# Patient Record
Sex: MALE | Race: WHITE | Employment: FULL TIME | URBAN - METROPOLITAN AREA
[De-identification: names, ages, dates, MRNs, and addresses within clinical notes are randomized per-mention and may not be internally consistent; named-entity substitution may affect disease eponyms.]

---

## 2018-03-20 ENCOUNTER — OFFICE VISIT - RIVER FALLS (OUTPATIENT)
Dept: FAMILY MEDICINE | Facility: CLINIC | Age: 53
End: 2018-03-20

## 2018-05-07 ENCOUNTER — AMBULATORY - RIVER FALLS (OUTPATIENT)
Dept: FAMILY MEDICINE | Facility: CLINIC | Age: 53
End: 2018-05-07

## 2018-05-08 LAB — CREAT SERPL-MCNC: 1.11 MG/DL (ref 0.7–1.33)

## 2018-05-09 ENCOUNTER — OFFICE VISIT - RIVER FALLS (OUTPATIENT)
Dept: FAMILY MEDICINE | Facility: CLINIC | Age: 53
End: 2018-05-09

## 2018-05-09 ASSESSMENT — MIFFLIN-ST. JEOR: SCORE: 2013.77

## 2018-12-14 ENCOUNTER — AMBULATORY - RIVER FALLS (OUTPATIENT)
Dept: FAMILY MEDICINE | Facility: CLINIC | Age: 53
End: 2018-12-14

## 2018-12-15 LAB
CREAT SERPL-MCNC: 1.1 MG/DL (ref 0.7–1.33)
GLUCOSE BLD-MCNC: 89 MG/DL (ref 65–99)

## 2018-12-17 ENCOUNTER — OFFICE VISIT - RIVER FALLS (OUTPATIENT)
Dept: FAMILY MEDICINE | Facility: CLINIC | Age: 53
End: 2018-12-17

## 2018-12-17 ASSESSMENT — MIFFLIN-ST. JEOR: SCORE: 1972.03

## 2020-02-25 ENCOUNTER — OFFICE VISIT - RIVER FALLS (OUTPATIENT)
Dept: FAMILY MEDICINE | Facility: CLINIC | Age: 55
End: 2020-02-25

## 2020-02-25 ASSESSMENT — MIFFLIN-ST. JEOR: SCORE: 1982.92

## 2020-03-03 ENCOUNTER — OFFICE VISIT - RIVER FALLS (OUTPATIENT)
Dept: FAMILY MEDICINE | Facility: CLINIC | Age: 55
End: 2020-03-03

## 2020-05-26 ENCOUNTER — OFFICE VISIT - RIVER FALLS (OUTPATIENT)
Dept: FAMILY MEDICINE | Facility: CLINIC | Age: 55
End: 2020-05-26

## 2020-06-09 ENCOUNTER — OFFICE VISIT - RIVER FALLS (OUTPATIENT)
Dept: FAMILY MEDICINE | Facility: CLINIC | Age: 55
End: 2020-06-09

## 2020-06-16 ENCOUNTER — OFFICE VISIT - RIVER FALLS (OUTPATIENT)
Dept: FAMILY MEDICINE | Facility: CLINIC | Age: 55
End: 2020-06-16

## 2020-06-30 ENCOUNTER — OFFICE VISIT - RIVER FALLS (OUTPATIENT)
Dept: FAMILY MEDICINE | Facility: CLINIC | Age: 55
End: 2020-06-30

## 2020-10-20 ENCOUNTER — OFFICE VISIT - RIVER FALLS (OUTPATIENT)
Dept: FAMILY MEDICINE | Facility: CLINIC | Age: 55
End: 2020-10-20

## 2020-12-10 ENCOUNTER — COMMUNICATION - RIVER FALLS (OUTPATIENT)
Dept: FAMILY MEDICINE | Facility: CLINIC | Age: 55
End: 2020-12-10

## 2021-03-22 ENCOUNTER — OFFICE VISIT - RIVER FALLS (OUTPATIENT)
Dept: FAMILY MEDICINE | Facility: CLINIC | Age: 56
End: 2021-03-22

## 2021-03-22 ASSESSMENT — MIFFLIN-ST. JEOR: SCORE: 2020.57

## 2021-04-26 ENCOUNTER — AMBULATORY - RIVER FALLS (OUTPATIENT)
Dept: FAMILY MEDICINE | Facility: CLINIC | Age: 56
End: 2021-04-26

## 2021-04-27 ENCOUNTER — COMMUNICATION - RIVER FALLS (OUTPATIENT)
Dept: FAMILY MEDICINE | Facility: CLINIC | Age: 56
End: 2021-04-27

## 2021-04-27 LAB
BUN SERPL-MCNC: 17 MG/DL (ref 7–25)
BUN/CREAT RATIO - HISTORICAL: ABNORMAL (ref 6–22)
CALCIUM SERPL-MCNC: 9.4 MG/DL (ref 8.6–10.3)
CHLORIDE BLD-SCNC: 107 MMOL/L (ref 98–110)
CHOLEST SERPL-MCNC: 187 MG/DL
CHOLEST/HDLC SERPL: 4.1 {RATIO}
CO2 SERPL-SCNC: 29 MMOL/L (ref 20–32)
CREAT SERPL-MCNC: 1.32 MG/DL (ref 0.7–1.33)
EGFRCR SERPLBLD CKD-EPI 2021: 59 ML/MIN/1.73M2
GLUCOSE BLD-MCNC: 94 MG/DL (ref 65–99)
HDLC SERPL-MCNC: 46 MG/DL
LDLC SERPL CALC-MCNC: 121 MG/DL
NONHDLC SERPL-MCNC: 141 MG/DL
POTASSIUM BLD-SCNC: 4.5 MMOL/L (ref 3.5–5.3)
SODIUM SERPL-SCNC: 140 MMOL/L (ref 135–146)
TRIGL SERPL-MCNC: 95 MG/DL

## 2022-02-12 VITALS
WEIGHT: 254.8 LBS | SYSTOLIC BLOOD PRESSURE: 130 MMHG | SYSTOLIC BLOOD PRESSURE: 142 MMHG | OXYGEN SATURATION: 97 % | TEMPERATURE: 98.7 F | HEART RATE: 68 BPM | HEIGHT: 72 IN | BODY MASS INDEX: 34.51 KG/M2 | HEART RATE: 84 BPM | TEMPERATURE: 97.9 F | DIASTOLIC BLOOD PRESSURE: 64 MMHG | WEIGHT: 249 LBS | DIASTOLIC BLOOD PRESSURE: 84 MMHG

## 2022-02-12 VITALS
HEART RATE: 86 BPM | OXYGEN SATURATION: 84 % | TEMPERATURE: 98.6 F | HEIGHT: 72 IN | DIASTOLIC BLOOD PRESSURE: 72 MMHG | WEIGHT: 256.3 LBS | SYSTOLIC BLOOD PRESSURE: 128 MMHG | BODY MASS INDEX: 34.72 KG/M2

## 2022-02-12 VITALS
HEIGHT: 72 IN | HEART RATE: 64 BPM | BODY MASS INDEX: 33.26 KG/M2 | DIASTOLIC BLOOD PRESSURE: 78 MMHG | TEMPERATURE: 98 F | SYSTOLIC BLOOD PRESSURE: 130 MMHG | WEIGHT: 245.6 LBS

## 2022-02-12 VITALS
HEART RATE: 64 BPM | DIASTOLIC BLOOD PRESSURE: 78 MMHG | BODY MASS INDEX: 33.59 KG/M2 | HEIGHT: 72 IN | SYSTOLIC BLOOD PRESSURE: 126 MMHG | TEMPERATURE: 97.4 F | WEIGHT: 248 LBS

## 2022-02-15 NOTE — TELEPHONE ENCOUNTER
Order, demographics, and note brought to [facility] per request, patient is aware they will contact him to schedule.

## 2022-02-15 NOTE — PROGRESS NOTES
Chief Complaint    rash area on left leg, left hand middle finger painfull, coughing, high blood pressure  History of Present Illness      Patient is here with several concerns.  He has had elevation of his blood pressure over the last year.  He gives blood on a regular basis and his blood pressure checked frequently.  Last spring his numbers were above 140/90.  They have stayed that way that time.  He is also checked at work with similar findings.  There is a family history of retention in his father.  He has a blood panel drawn yearly for his work wellness program.  He reports all of his numbers are normal.       He has a painful nodule on the proximal phalanx of his left middle finger.  He has mild discomfort with flexion.  If he grabs something firm it is painful.  He also has pain in the MCP joint.  Denies trigger finger.       He has a persistent rash on the anterior left leg.  This was previously treated with topical steroids without much success.       He has had a cough for last 2 weeks.  Began with a minor upper respiratory infection.  Last week he had fever, chills, and sweats.  He was playing on the couch all weekend.  His cough has become more productive.  He is to have fatigue.  Review of Systems      See HPI.  All other review of systems negative.  Physical Exam   Vitals & Measurements    T: 98.7(Tympanic)  HR: 84(Peripheral)  BP: 142/84  SpO2: 97%     WT: 249 lb       Alert, oriented, no acute distress      Ear canals patent, TMs clear      Throat is clear      Neck supple without adenopathy or thyromegaly       Heart has a regular rate and rhythm with no murmurs, gallops, or rubs effusion       Lungs are clear with slightly decreased basilar air movement       No lower extremity edema       Psoriatic plaque on the anterior left leg       Chest x-ray there is a questionable retrocardiac infiltrate  Assessment/Plan       1. Psoriasis         Treatment with clobetasol topical cream twice daily and  follow-up if symptoms are improving                2. Atypical pneumonia         Rest, analgesics, antibiotics, azithromycin        Follow-up if not improving                Nodule of flexor tendon sheath         Reassurance, analgesics as needed                Primary hypertension         Lisinopril 10 mg daily recheck in 1 month with creatinine and potassium        He will bring in these wellness labs for review at that time as well.  Side effects of medications discussed         Ordered:          azithromycin, 1 tab, PO, Daily, Instructions: Take 2 today then 1 daily for 4 days, # 6 tab(s), 0 Refill(s), Type: Maintenance, Pharmacy: [facility], 1 tab po daily,x5 day(s),Instr:Take 2 today then 1 daily for 4 days          lisinopril, 1 tab(s) ( 10 mg ), PO, Daily, # 30 tab(s), 1 Refill(s), Type: Maintenance, Pharmacy: [facility], 1 tab(s) po daily                Orders:         clobetasol topical, 1 lane, TOP, BID, # 30 g, 1 Refill(s), Type: Maintenance, Pharmacy: [facility], 1 lane top bid         clobetasol topical, 1 lane, TOP, BID, # 45 g, 0 Refill(s), Type: Maintenance, Pharmacy: [facility], 1 lane top bid         XR Chest PA/Lat (Request), Cough  Patient Information     Name:JAZIEL CAMPBELLJABIER MONTAGUE      Address:      [address] [place] [id number]     Sex:Male     YOB: 1965     [phone]     MRN:[id number]     FIN:[id number]     Location:[facility]     Date of Service:03/20/2018      Primary Care Physician:       Royer Treadwell MD, [phone]  Problem List/Past Medical History    Ongoing     Obesity     Obstructive sleep apnea syndrome     Tobacco abuse     Tubular adenoma      Comments: x 2     Tubulovillous adenoma      Comments: x 1    Historical  Procedure/Surgical History     Colonoscopy (05/01/2014)     Polysomnogram (12/09/2013)     Excision of lesion of skin (04/2006)     Appendectomy (01/1985)  Medications        azithromycin 250 mg oral tablet: 1  tab, PO, Daily, for 5 day(s), Take 2 today then 1 daily for 4 days, 6 tab(s), 0 Refill(s).        clobetasol 0.05% topical cream: 1 lane, TOP, BID, 45 g, 0 Refill(s).        lisinopril 10 mg oral tablet: 10 mg, 1 tab(s), PO, Daily, 30 tab(s), 1 Refill(s).                Allergies    No known allergies  Social History    Smoking Status - 03/20/2018     Former smoker     Alcohol - 11/07/2013      Current, occasional     Tobacco - 11/07/2013      Past  Immunizations      Vaccine Date Status      tetanus/diphth/pertuss (Tdap) adult/adol 11/17/2007 Recorded  Lab Results      Results (Last 90 days)      No results located.

## 2022-02-15 NOTE — NURSING NOTE
Depression Screening Entered On:  3/22/2021 3:31 PM CDT    Performed On:  3/22/2021 3:30 PM CDT by Farzana HOLT, Snow               Depression Screening   Little Interest - Pleasure in Activities :   Not at all   Feeling Down, Depressed, Hopeless :   Not at all   Initial Depression Screen Score :   0 Score   Poor Appetite or Overeating :   Not at all   Trouble Falling or Staying Asleep :   Not at all   Feeling Tired or Little Energy :   Not at all   Feeling Bad About Yourself :   Not at all   Trouble Concentrating :   Several days   Moving or Speaking Slowly :   Not at all   Thoughts Better Off Dead or Hurting Self :   Not at all   Difficulty at Work, Home, Getting Along :   Not difficult at all   Detailed Depression Screen Score :   1    Total Depression Screen Score :   1    Snow Yanes MA - 3/22/2021 3:30 PM CDT

## 2022-02-15 NOTE — NURSING NOTE
Depression Screening Entered On:  2/26/2020 9:08 AM CST    Performed On:  2/25/2020 9:08 AM CST by Naomie Coles CMA               Depression Screening   Little Interest - Pleasure in Activities :   Not at all   Feeling Down, Depressed, Hopeless :   Not at all   Initial Depression Screen Score :   0    Trouble Falling or Staying Asleep :   Not at all   Feeling Tired or Little Energy :   Not at all   Poor Appetite or Overeating :   Not at all   Feeling Bad About Yourself :   Not at all   Trouble Concentrating :   Nearly every day   Moving or Speaking Slowly :   Not at all   Thoughts Better Off Dead or Hurting Self :   Not at all   Detailed Depression Screen Score :   3    Total Depression Screen Score :   3    Naomie Coles CMA - 2/26/2020 9:08 AM CST

## 2022-02-15 NOTE — PROGRESS NOTES
Patient:   FARZANEH CAMPBELL            MRN: [id number]            FIN: [id number]               Age:   54 years     Sex:  Male     :  1965   Associated Diagnoses:   Colon polyp; Hypertension goal BP (blood pressure) < 130/80; Obstructive sleep apnea syndrome; Physical exam   Author:   Greg Plasencia MD      Visit Information      Date of Service: 2020 01:40 pm  Performing Location: [facility]  Encounter#: [id number]      Primary Care Provider (PCP):  Royer Treadwell MD    NPI# [id number]      Referring Provider:  Greg Plasencia MD    NPI# [id number]      Chief Complaint   2020 1:53 PM CST    f/u HTN              Additional Information:No additional information recorded during visit.   Chief complaint and symptoms as noted above and confirmed with patient.  Recent lab and diagnostic studies reviewed with patient      History of Present Illness   2020: Farzaneh presents to clinic for annual physical as well as hypertension refill.  Typically sees GTG.  Tolerating medications without issues.  Typically undergoes an employer based labs screening each year.  Overdue for follow-up colonoscopy.  Originally had colonoscopy in  with 3 adenomatous polyps.  Not aware of any previous PSA screening.  Does have sleep apnea and is compliant with CPAP use.         Review of Systems   Constitutional:  No fever, No chills.    Eye:  Negative except as documented in history of present illness.    Ear/Nose/Mouth/Throat:  Negative except as documented in history of present illness.    Respiratory:  No shortness of breath.    Cardiovascular:  No chest pain, No palpitations, No peripheral edema, No syncope.    Gastrointestinal:  No nausea, No vomiting, No abdominal pain.    Genitourinary:  No dysuria, No hematuria.    Hematology/Lymphatics:  Negative except as documented in history of present illness.    Endocrine:  No excessive thirst, No polyuria.    Immunologic:  No recurrent fevers.    Musculoskeletal:   No joint pain, No muscle pain.    Neurologic:  Alert and oriented X4, No numbness, No tingling, No headache.       Health Status   Allergies:    Allergic Reactions (Selected)  No Known Medication Allergies   Medications:  (Selected)   Prescriptions  Prescribed  lisinopril 10 mg oral tablet: = 1 tab(s) ( 10 mg ), PO, Daily, # 90 tab(s), 3 Refill(s), Type: Maintenance, Pharmacy: [facility], 1 tab(s) Oral daily,    Medications          *denotes recorded medication          lisinopril 10 mg oral tablet: 10 mg, 1 tab(s), PO, Daily, 90 tab(s), 3 Refill(s).       Problem list:    All Problems  Tubulovillous adenoma / SNOMED CT 93821890 / Confirmed  Hypertension goal BP (blood pressure) < 130/80 / SNOMED CT 0706449884 / Confirmed  Obesity / ICD-9-.00 / Probable  Obstructive sleep apnea syndrome / SNOMED CT 132675132 / Confirmed  Tobacco abuse / ICD-9-.1 / Confirmed  Tubular adenoma / SNOMED CT 3314879279 / Confirmed      Histories   Past Medical History:    Active  Obstructive sleep apnea syndrome (285926452): Onset on 12/9/2013 at 48 years.  Tobacco abuse (305.1)  Hypertension goal BP (blood pressure) < 130/80 (5439572523)   Family History:    No family history items have been selected or recorded.   Procedure history:    Colonoscopy (836552915) on 5/1/2014 at 48 Years.  Comments:  5/13/2014 3:43 PM CDT - Clarissa Morales CMA  Indication: screening  Sedation: MAC (sleep apnea)  Tubular adenoma x 2, tubuvillious adenoma x 1  Repeat in 3 years  Polysomnogram (984709738) on 12/9/2013 at 48 Years.  Comments:  12/18/2013 7:38 PM CST - Treasure Noel CMA  AHI of 18.7/hr with REM AHI of 54.5/hr.  CPAP titrated to +8cm H2o which reduced AHI to 1.4/hr and eliminated snoring.  Excision of lesion of skin (82287622) in the month of 4/2006 at 40 Years.  Appendectomy (040862103) in the month of 1/1985 at 19 Years.   Social History:        Alcohol Assessment            Current, occasional      Tobacco Assessment             Past        Physical Examination   vital signs stable, as noted above   Vital Signs   2/25/2020 1:53 PM CST Temperature Tympanic 97.4 DegF  LOW    Peripheral Pulse Rate 64 bpm    Systolic Blood Pressure 126 mmHg    Diastolic Blood Pressure 78 mmHg    Mean Arterial Pressure 94 mmHg    BP Site Right arm      Measurements from flowsheet : Measurements   2/25/2020 1:53 PM CST Height Measured - Standard 72 in    Weight Measured - Standard 248 lb    BSA 2.39 m2    Body Mass Index 33.63 kg/m2  HI      General:  Alert and oriented, No acute distress.    Eye:  Extraocular movements are intact.    HENT:  Normocephalic, Oral mucosa is moist.    Neck:  Supple, No carotid bruit, No jugular venous distention, No lymphadenopathy.    Respiratory:  Lungs are clear to auscultation, Respirations are non-labored.    Cardiovascular:  Normal rate, No murmur, No edema.    Gastrointestinal:  Soft, Non-distended, Normal bowel sounds, No organomegaly.    Musculoskeletal:  Normal range of motion, Normal strength, No tenderness.    Integumentary:  keloid appearing scar/lesion on left anterior tibial surface.    Neurologic:  Alert, Oriented, Normal motor function, No focal deficits.    Cognition and Speech:  Oriented, Speech clear and coherent.    Psychiatric:  Appropriate mood & affect.       Impression and Plan   Diagnosis     Colon polyp (LXS15-SV K63.5).     Hypertension goal BP (blood pressure) < 130/80 (OXD42-HY I10).     Obstructive sleep apnea syndrome (FRH75-LX G47.33).     Physical exam (BXL78-MQ Z00.00).       .) hypertension, controlled  current antihypertensive regimen: lisinopril 10mg daily  regimen changes:  intolerance:  future titration/work-up plan:     .) health maintenance  - YAIMA, adherent with CPAP use  - h/o multiple adenomatous colonic polyps, last colonoscopy in '14   - overdue for f/u; arrange f/u colonoscopy, ASA3   - ECG completed  - declines PSA screening  - comprehensive employer lab screening - advised to share  results when completed  - skin lesion on left shin - advised to d/w GTG in the future to consider biopsy    RTC annually

## 2022-02-15 NOTE — PROGRESS NOTES
Patient:   FARZANEH CAMPBELL            MRN: [id number]            FIN: [id number]               Age:   54 years     Sex:  Male     :  1965   Associated Diagnoses:   Left anterior fascicular block   Author:   Greg Plasencia MD      Procedure   EKG procedure   Indication: pre-procedure.     Position: supine.     EKG findings   Interpretation: by primary care provider.     Rhythm: heart rate  59  beats/min, sinus normal.     Axis: left axis deviation.     Within normal limits.     Intervals: MN normal, QRS normal, QT normal.     Normal EKG.     P waves: normal.     QRS complex: normal, no Q waves present.     ST-T-U complex: normal.     Interpretation: no ST-T wave abnormalities.     Discussed: with patient.        Impression and Plan   Diagnosis     Left anterior fascicular block (CYV11-ZU I44.4).     Orders

## 2022-02-15 NOTE — TELEPHONE ENCOUNTER
---------------------  From: Amira Gonzalez RN   To: Rani Cortés;     Sent: 4/6/2021 8:55:17 AM CDT  Subject: HA issue     1 hearing aid he cannot get to turn on (left ear)    Appears to be charging. May be plugged    Phone: [phone]    Says he can stop in quickly this morning for you to look at it if that works for you.---------------------  From: Rani Cortés   To: Amira Gonzalez RN;     Sent: 4/6/2021 10:03:41 AM CDT  Subject: RE: HA issue     Could someone call him back and let him know that I had a 1:30 open up today if that works for him?  If not he can drop it off and I can take a look at it when I have a break.  Thanks!  Rani---------------------  From: Amira Gonzalez RN   To: Appointment Pool ([id number]);     Sent: 4/6/2021 10:12:48 AM CDT !  Subject: FW: HA issue---------------------  From: Magaly Cody (Appointment Pool ([id number]))   To: Amira Gonzalez RN;     Sent: 4/6/2021 10:25:03 AM CDT  Subject: RE: HA issue     pt already dropped off hearing aide for her to just take a look when she has time

## 2022-02-15 NOTE — LETTER
(Inserted Image. Unable to display)     February 26, 2021      FARZANEH MARIA SHER  [place], [place] [id number]          Dear FARZANEH,      Thank you for selecting [facility] (previously [facility]) for your healthcare needs.    Our records indicate you are due for the following services:    Annual Physical.  Hypertension check ~ please remember to bring your at-home blood pressure readings with you to your appointment.   Non-Fasting Labs.    If you had your labs done at another facility or with [facility] at [facility], please bring in a copy of the results to your next visit, mail a copy, or drop off a copy of your results to your Healthcare Provider.    (FYI   Regarding office visits: In some instances, a video visit or telephone visit may be offered as an option.)    You are due for lab work and an office visit; please schedule the lab appointment 1 week before the office visit.  This will assure all results are available to discuss with your Healthcare Provider during your visit.    **It is very helpful if you bring your medication bottles to your appointment.  This assures we have all of your current medications, including strength and dosing information, documented accurately in your medical record.    To schedule an appointment or if you have further questions, please contact your clinic at [phone].      Powered by [email or web address]    Sincerely,    Greg Plasencia MD

## 2022-02-15 NOTE — PROGRESS NOTES
Chief Complaint    med check--refill lisinopril  History of Present Illness       Patient is here for follow-up on his hypertension.  He has been on lisinopril with no side effects.  He reports his blood pressure is usually in the 120/60 range.  He is due for laboratory evaluation today.  He is also due for another lipid panel has been over 5 years since his last check.  Review of Systems       See HPI.  All other review of systems negative.  Physical Exam   Vitals & Measurements    T: 98.6  F (Tympanic)  HR: 86 (Peripheral)  BP: 128/72  SpO2: 84%     HT: 72 in  WT: 256.3 lb  BMI: 34.76        Alert, oriented, no acute distress       Neck supple without adenopathy, thyromegaly, carotid bruit       Heart has a regular rate and rhythm       Lungs are clear       Cooperative, appropriate affect  Assessment/Plan       1. Primary hypertension (I10)          Primary hypertension well-controlled         Continue with lisinopril         Return laboratory visit for lipid panel and BMP.         Ordered:          lisinopril, = 1 tab(s) ( 10 mg ), PO, Daily, # 90 tab(s), 3 Refill(s), Type: Maintenance, Pharmacy: [facility], 1 tab(s) Oral daily, 72, in, 03/22/21 14:27:00 CDT, Height Measured, 256.3, lb, 03/22/21 14:27:00 CDT, Weight Measured, (Ordered)                Orders:         lisinopril, = 1 tab(s) ( 10 mg ), PO, Daily, # 90 tab(s), 3 Refill(s), Type: Hard Stop, Pharmacy: [facility], (Completed)         Return to Clinic (Request), RFV: fasting labs per GTG: lipid panel, BMP, Return in 1wk  Patient Information     Name:JAZIEL CAMPBELLJABIER MONTAGUE      Address:      [address] [place] [id number]     Sex:Male     YOB: 1965     [phone]     Emergency Contact:ANGELA CAMPBELL     MRN:[id number]     FIN:[id number]     Location:[facility]     Date of Service:03/22/2021      Primary Care Physician:       Royer Treadwell MD, [phone]       Royer Treadwell MD, [phone]       Attending Physician:       Royer Treadwell MD, [phone]  Problem List/Past Medical History    Ongoing     Hypertension goal BP (blood pressure) < 130/80     Obesity     Obstructive sleep apnea syndrome     Tobacco abuse     Tubular adenoma       Comments: x 2     Tubulovillous adenoma       Comments: x 1    Historical     No qualifying data  Procedure/Surgical History     Colonoscopy (06/12/2020)      Comments: Indication: History of adenomatous polyps.      Sedation: MAC.      Impression: Two 4 - 6 mm polyps in proximal ascending colon.        Recommendation: Repeat in 5 years..     Colonoscopy (05/01/2014)      Comments: Indication: screening      Sedation: MAC (sleep apnea)      Tubular adenoma x 2, tubuvillious adenoma x 1      Repeat in 3 years.     Polysomnogram (12/09/2013)      Comments: AHI of 18.7/hr with REM AHI of 54.5/hr.  CPAP titrated to +8cm H2o which reduced AHI to 1.4/hr and eliminated snoring..     Excision of lesion of skin (04/2006)     Appendectomy (01/1985)  Medications    lisinopril 10 mg oral tablet, 10 mg= 1 tab(s), Oral, daily, 3 refills  Allergies    No Known Medication Allergies  Social History    Smoking Status     Former smoker     Alcohol      Current, occasional     Electronic Cigarette/Vaping      Electronic Cigarette Use: Never.     Tobacco      Former smoker, quit more than 30 days ago  Immunizations      Vaccine Date Status          tetanus/diphth/pertuss (Tdap) adult/adol 11/17/2007 Recorded

## 2022-02-15 NOTE — TELEPHONE ENCOUNTER
Entered by Naomie Coles CMA on December 10, 2020 1:06:55 PM CST  ---------------------  From: Naomie Coles CMA   To: [facility]    Sent: 12/10/2020 1:06:54 PM CST  Subject: Medication Management     ** Not Approved: Refill not appropriate, filled 2/25/20 x 1year - pt should still have refill remaining **  lisinopril (LISINOPRIL 10MG TABLET)  TAKE ONE TABLET BY MOUTH ONCE DAILY  Qty:  90 EA        Days Supply:  90        Refills:  3          Substitutions Allowed     Route To Pharmacy - [facility]   Signed by Naomie Coles CMA            ------------------------------------------  From: [facility]  To: Greg Plasencia MD  Sent: December 9, 2020 4:21:27 PM CST  Subject: Medication Management  Due: December 8, 2020 10:07:04 AM CST     ** On Hold Pending Signature **     Drug: lisinopril (lisinopril 10 mg oral tablet), TAKE ONE TABLET BY MOUTH ONCE DAILY  Quantity: 90 EA  Days Supply: 90  Refills: 3  Substitutions Allowed  Notes from Pharmacy:     Dispensed Drug: lisinopril (lisinopril 10 mg oral tablet), TAKE ONE TABLET BY MOUTH ONCE DAILY  Quantity: 90 EA  Days Supply: 90  Refills: 3  Substitutions Allowed  Notes from Pharmacy:  ------------------------------------------

## 2022-02-15 NOTE — PROGRESS NOTES
Chief Complaint    HTN f/u, review labs from 5/7/18.  History of Present Illness      Patient is here for follow-up on his primary hypertension.  He was started on lisinopril 10 mg daily about 6 weeks ago.  Blood pressure has improved.  Systolic numbers are down at least 10 points.  He had a recent creatinine and potassium that were normal.       He requests refill of clobetasol for psoriasis on the left leg.       He inquires about sildenafil for erectile dysfunction.  This is been a problem for the last few months.  He is able to obtain an erection but it is not firm enough for intercourse.  Erectile dysfunction was present prior to beginning lisinopril.  Review of Systems      See HPI.  All other review of systems negative.  Physical Exam   Vitals & Measurements    T: 97.9   F (Tympanic)  HR: 68(Peripheral)  BP: 130/64     HT: 72 in  WT: 254.8 lb  BMI: 34.55       Alert, oriented, no acute distress      Normal heart rate      Nonlabored breathing      Cooperative, appropriate affect  Assessment/Plan       1. Primary hypertension (I10)         Improved with current dose of lisinopril, continue 10 mg daily, prescription sent to pharmacy         Orders:          lisinopril, 1 tab(s) ( 10 mg ), PO, Daily, # 30 tab(s), 1 Refill(s), Type: Hard Stop, Pharmacy: [facility], (Completed)          lisinopril, 1 tab(s) ( 10 mg ), PO, Daily, # 90 tab(s), 3 Refill(s), Type: Maintenance, Pharmacy: [facility], 1 tab(s) po daily, (Ordered)                Erectile dysfunction (N52.9)         Suspect is multifactorial        Trial of sildenafil 50 mg as needed        Discussed side effects medication                Psoriasis (L40.9)         Improving with clobetasol        We will continue with the cream as needed                Orders:         clobetasol topical, 1 lane, TOP, BID, # 45 g, 1 Refill(s), Type: Maintenance, Pharmacy: [facility], 1 lane top bid, (Ordered)         clobetasol topical, 1 lane, TOP, BID, # 45 g, 0  Refill(s), Type: Hard Stop, Pharmacy: [facility], (Completed)         sildenafil, 1 tab(s) ( 50 mg ), po, daily, # 10 tab(s), 1 Refill(s), Type: Maintenance, Pharmacy: [facility], 1 tab(s) po daily, (Ordered)  Patient Information     Name:FARZANEH CAMPBELL CLARI      Address:      [address] [place] [address]     Sex:Male     YOB: 1965     [phone]     Emergency Contact:FLACA CAMPBELL     MRN:[id number]     FIN:[id number]     Location[facility]     Date of Service:05/09/2018      Primary Care Physician:       Royer Treadwell MD, [phone]      Attending Physician:       Royer Treadwell MD, [phone]  Problem List/Past Medical History    Ongoing     Obesity     Obstructive sleep apnea syndrome     Tobacco abuse     Tubular adenoma       Comments: x 2     Tubulovillous adenoma       Comments: x 1    Historical     No qualifying data  Procedure/Surgical History     Colonoscopy (05/01/2014)       Comments: Indication: screening<br/>Sedation: MAC (sleep apnea)<br/>Tubular adenoma x 2, tubuvillious adenoma x 1<br/>Repeat in 3 years     Polysomnogram (12/09/2013)       Comments: AHI of 18.7/hr with REM AHI of 54.5/hr. &nbsp;CPAP titrated to +8cm H2o which reduced AHI to 1.4/hr and eliminated snoring.     Excision of lesion of skin (04/2006)     Appendectomy (01/1985)  Medications        lisinopril 10 mg oral tablet: 10 mg, 1 tab(s), PO, Daily, 90 tab(s), 3 Refill(s).        sildenafil 50 mg oral tablet: 50 mg, 1 tab(s), po, daily, 10 tab(s), 1 Refill(s).        clobetasol 0.05% topical cream: 1 lane, TOP, BID, 45 g, 1 Refill(s).                Allergies    No known allergies  Social History    Smoking Status - 05/09/2018     Former smoker     Alcohol      Current, occasional, 02/07/2012     Tobacco      Past, 11/07/2013  Immunizations      Vaccine Date Status      tetanus/diphth/pertuss (Tdap) adult/adol 11/17/2007 Recorded  Lab Results          Lab  Results (Last 4 results within 90 days)           Potassium Level: 4.3 mmol/L [3.5 mmol/L - 5.3 mmol/L] (05/07/18 09:50:00)          Creatinine Level: 1.11 mg/dL [0.7 mg/dL - 1.33 mg/dL] (05/07/18 09:50:00)          eGFR: 75 mL/min/1.73m2 [3.5 mmol/L - 5.3 mmol/L] (05/07/18 09:50:00)          eGFR African American: 87 mL/min/1.73m2 [3.5 mmol/L - 5.3 mmol/L] (05/07/18 09:50:00)

## 2022-02-15 NOTE — NURSING NOTE
CAGE Assessment Entered On:  3/22/2021 3:30 PM CDT    Performed On:  3/22/2021 3:30 PM CDT by Snow Yanes MA               Assessment   Have you ever felt you should cut down on your drinking :   No   Have people annoyed you by criticizing your drinking :   No   Have you ever felt bad or guilty about your drinking :   No   Have you ever taken a drink first thing in the morning to steady your nerves or get rid of a hangover (Eye-opener) :   No   CAGE Score :   0    Snow Yanes MA - 3/22/2021 3:30 PM CDT

## 2022-02-15 NOTE — NURSING NOTE
Comprehensive Intake Entered On:  2/25/2020 1:56 PM CST    Performed On:  2/25/2020 1:53 PM CST by Naomie Coles CMA               Summary   Chief Complaint :   f/u HTN   Weight Measured :   248 lb(Converted to: 248 lb 0 oz, 112.49 kg)    Height Measured :   72 in(Converted to: 6 ft 0 in, 182.88 cm)    Body Mass Index :   33.63 kg/m2 (HI)    Body Surface Area :   2.39 m2   Systolic Blood Pressure :   126 mmHg   Diastolic Blood Pressure :   78 mmHg   Mean Arterial Pressure :   94 mmHg   Peripheral Pulse Rate :   64 bpm   BP Site :   Right arm   Temperature Tympanic :   97.4 DegF(Converted to: 36.3 DegC)  (LOW)    Naomie Cloes CMA - 2/25/2020 1:53 PM CST   Health Status   Allergies Verified? :   Yes   Medication History Verified? :   Yes   Immunizations Current :   Yes   Medical History Verified? :   Yes   Pre-Visit Planning Status :   Completed   Tobacco Use? :   Former smoker   Naomie Coles CMA - 2/25/2020 1:53 PM CST   Social History   Social History   (As Of: 2/25/2020 1:56:16 PM CST)   Alcohol:        Current, occasional   (Last Updated: 2/7/2012 3:13:29 PM CST by Basilia Licona)          Tobacco:        Past   (Last Updated: 11/7/2013 8:53:00 AM CST by Treasure Noel CMA)            OB/GYN History   Pregnancy History   (As Of: 2/25/2020 1:56:16 PM CST)   No pregnancy history documented

## 2022-02-15 NOTE — NURSING NOTE
Comprehensive Intake Entered On:  3/22/2021 2:32 PM CDT    Performed On:  3/22/2021 2:27 PM CDT by Farzana HOLT, Snow               Summary   Chief Complaint :   med check--refill lisinopril   Weight Measured :   256.3 lb(Converted to: 256 lb 5 oz, 116.256 kg)    Height Measured :   72 in(Converted to: 6 ft 0 in, 182.88 cm)    Body Mass Index :   34.76 kg/m2 (HI)    Body Surface Area :   2.43 m2   Systolic Blood Pressure :   166 mmHg (HI)    Diastolic Blood Pressure :   80 mmHg   Mean Arterial Pressure :   109 mmHg   Peripheral Pulse Rate :   86 bpm   BP Site :   Right arm   Pulse Site :   Radial artery   BP Method :   Manual   HR Method :   Manual   Temperature Tympanic :   98.6 DegF(Converted to: 37.0 DegC)    Oxygen Saturation :   84 % (LOW)    Snow Yanes MA - 3/22/2021 2:27 PM CDT   Health Status   Allergies Verified? :   Yes   Medication History Verified? :   Yes   Immunizations Current :   Yes   Medical History Verified? :   Yes   Pre-Visit Planning Status :   Completed   Tobacco Use? :   Former smoker   Snow Yanes MA - 3/22/2021 2:27 PM CDT   Consents   Consent for Immunization Exchange :   Consent Granted   Consent for Immunizations to Providers :   Consent Granted   Snow Yanes MA - 3/22/2021 2:27 PM CDT   Meds / Allergies   (As Of: 3/22/2021 2:32:18 PM CDT)   Allergies (Active)   No Known Medication Allergies  Estimated Onset Date:   Unspecified ; Created By:   Naomie Coles CMA; Reaction Status:   Active ; Category:   Drug ; Substance:   No Known Medication Allergies ; Type:   Allergy ; Updated By:   Naomie Coles CMA; Reviewed Date:   2/25/2020 1:52 PM CST        Medication List   (As Of: 3/22/2021 2:32:18 PM CDT)   Prescription/Discharge Order    lisinopril  :   lisinopril ; Status:   Prescribed ; Ordered As Mnemonic:   lisinopril 10 mg oral tablet ; Simple Display Line:   10 mg, 1 tab(s), PO, Daily, 90 tab(s), 3 Refill(s) ; Ordering Provider:   Greg Plasencia MD; Catalog Code:   lisinopril ;  Order Dt/Tm:   2/25/2020 2:11:59 PM CST            ID Risk Screen   Recent Travel History :   No recent travel   Family Member Travel History :   No recent travel   Other Exposure to Infectious Disease :   Unknown   COVID-19 Testing Status :   Positive COVID-19 test greater than 30 days ago   Snow Yanes MA - 3/22/2021 2:27 PM CDT   Social History   Social History   (As Of: 3/22/2021 2:32:18 PM CDT)   Alcohol:        Current, occasional   (Last Updated: 2/7/2012 3:13:29 PM CST by Basilia Licona)          Tobacco:        Former smoker, quit more than 30 days ago   (Last Updated: 3/22/2021 2:27:35 PM CDT by Snow Yanes MA)          Electronic Cigarette/Vaping:        Electronic Cigarette Use: Never.   (Last Updated: 3/22/2021 2:27:40 PM CDT by Snow Yanes MA)

## 2022-02-15 NOTE — LETTER
(Inserted Image. Unable to display)   March 31, 2021  [place] [address]          Dear FARZANEH,      Thank you for selecting [facility] for your healthcare needs.    Our records indicate you are due for the following services:     Fasting Lab Tests ~ Please do not eat or drink anything 10 hours prior to your scheduled appointment time.  (Water and any medications that you may need are allowed unless directed otherwise.)    If you had your labs done at another facility or with Direct Access Lab Testing at [facility], please bring in a copy of the results to your next visit, mail a copy, or drop off a copy of your results to your Healthcare Provider.    (FYI   Regarding office visits: In some instances, a video visit or telephone visit may be offered as an option.)        To schedule an appointment or if you have further questions, please contact your clinic at [phone].      Powered by [employer]    Sincerely,    Royer Treadwell M.D.

## 2022-02-15 NOTE — TELEPHONE ENCOUNTER
---------------------  From: Chantel Stern LPN (Phone Messages Pool ([id number]_[facility]))   To: Appointment Pool ([id number]_[facility]);     Cc: Rani Cortés;      Sent: 10/20/2020 9:28:01 AM CDT  Subject: General Message     Pt LM at 8:58am stating he is having problems with is hearing aids. Pt says they will not turn on.     Pt asking to get in today to get a new set of sensors since Rani Cortés is only in clinic 1 day a week.    [phone] Rani to see if ok to work in

## 2022-02-15 NOTE — LETTER
(Inserted Image. Unable to display)   [address] [place]  [address]  April 27, 2021                      [place] [address]        Dear FARZANEH,    Thank you for selecting [facility] for your health care needs.  Below you will find the results of your recent test(s) done at our clinic.     Your recent lab tests look good.      Result Name Current Result Reference Range   Sodium Level (mmol/L)  140 4/26/2021 135 - 146   Potassium Level (mmol/L)  4.5 4/26/2021 3.5 - 5.3   Chloride Level (mmol/L)  107 4/26/2021 98 - 110   CO2 Level (mmol/L)  29 4/26/2021 20 - 32   Glucose Level (mg/dL)  94 4/26/2021 65 - 99   BUN (mg/dL)  17 4/26/2021 7 - 25   Creatinine Level (mg/dL)  1.32 4/26/2021 0.70 - 1.33   eGFR (mL/min/1.73m2) ((L)) 59 4/26/2021 > OR = 60 -    Calcium Level (mg/dL)  9.4 4/26/2021 8.6 - 10.3   Cholesterol (mg/dL)  187 4/26/2021  - <200   Non-HDL Cholesterol ((H)) 141 4/26/2021  - <130   HDL (mg/dL)  46 4/26/2021 > OR = 40 -    Cholesterol/HDL Ratio  4.1 4/26/2021  - <5.0   LDL ((H)) 121 4/26/2021    Triglyceride (mg/dL)  95 4/26/2021  - <150       Please contact me or my assistant at [phone] if you have any questions about your results.    Sincerely,        Anthony Treadwell MD        What do your labs mean?  Below is a glossary of commonly ordered labs:  LDL   Bad Cholesterol   HDL   Good Cholesterol  AST/ALT   Liver Function   Cr/Creatinine   Kidney Function  Microalbumin   Kidney Function  BUN   Kidney Function  PSA   Prostate    TSH   Thyroid Hormone  HgbA1c   Diabetes Test   Hgb (Hemoglobin)   Red Blood Cells

## 2022-02-15 NOTE — NURSING NOTE
Vital Signs Entered On:  3/22/2021 3:29 PM CDT    Performed On:  3/22/2021 3:29 PM CDT by Snow Yanes MA               Vital Signs   Systolic Blood Pressure :   128 mmHg   Diastolic Blood Pressure :   72 mmHg   Mean Arterial Pressure :   91 mmHg   BP Site :   Right arm   Snow Yanes MA - 3/22/2021 3:29 PM CDT

## 2022-02-15 NOTE — PROGRESS NOTES
Patient:   FARZANEH CAMPBELL            MRN: [id number]            FIN: [id number]               Age:   53 years     Sex:  Male     :  1965   Associated Diagnoses:   Hypertension   Author:   Royer Treadwell MD      Visit Information      Date of Service: 2018 03:30 pm  Performing Location: [facility]  Encounter#: [id number]      Primary Care Provider (PCP):  Royer Treadwell MD    NPI# [id number]   Visit type:  On-going care, Scheduled follow-up.         Medication: Follow-up, Refill.    Source of history:  Self, Medical record.    History limitation:  None.       Chief Complaint   2018 3:41 PM CST   here for HTN f/u, had labs done 18.      History of Present Illness             The patient presents for follow-up evaluation of hypertension.  The quality of hypertension symptom(s) since the patient's last visit is described as being unchanged.  The severity of the hypertension symptom(s) since the last visit is mild.  Since the patient's last visit, the timing/course of hypertension symptom(s) remains unchanged.  The context of the hypertension: blood pressure was maintained within the target range.  Exacerbating factors consist of.  Relieving factors consist of medication.  Associated symptoms consist of none.  Prior treatment consists of medication(s): lisinopril.  Additional pertinent history: previous labs reviewed.        Review of Systems   Constitutional:  No fever, No chills, No sweats, No weakness, No fatigue.    Eye:  No recent visual problem.    Ear/Nose/Mouth/Throat:  No decreased hearing, No nasal congestion, No sore throat.    Respiratory:  No shortness of breath, No cough.    Cardiovascular:  Negative, No chest pain, No palpitations, No peripheral edema.    Gastrointestinal:  No nausea, No vomiting, No diarrhea, No constipation, No heartburn.    Genitourinary:  No dysuria, No change in urine stream.    Hematology/Lymphatics:  No bruising tendency, No bleeding  tendency.    Endocrine:  No cold intolerance, No heat intolerance.    Immunologic:  Negative.    Musculoskeletal:  No back pain, No neck pain, No joint pain, No muscle pain.    Integumentary:  No rash, No dryness, No skin lesion.    Neurologic:  Alert and oriented X4, No headache.    Psychiatric:  No anxiety, No depression.       Health Status   Allergies:    Allergic Reactions (Selected)  No known allergies   Medications:  (Selected)   Prescriptions  Prescribed  clobetasol 0.05% topical cream: 1 lane, TOP, BID, # 45 g, 1 Refill(s), Type: Maintenance, Pharmacy: [facility], 1 lane top bid  lisinopril 10 mg oral tablet: 1 tab(s) ( 10 mg ), PO, Daily, # 90 tab(s), 3 Refill(s), Type: Maintenance, Pharmacy: [facility], 1 tab(s) po daily  sildenafil 50 mg oral tablet: 1 tab(s) ( 50 mg ), po, daily, # 10 tab(s), 1 Refill(s), Type: Maintenance, Pharmacy: [facility], 1 tab(s) po daily   Problem list:    All Problems  Hypertension goal BP (blood pressure) < 130/80 / SNOMED CT 3519542764 / Confirmed  Obstructive sleep apnea syndrome / SNOMED CT 132926433 / Confirmed  Obesity / ICD-9-.00 / Probable  Tubular adenoma / SNOMED CT 4449515614 / Confirmed  Tobacco abuse / ICD-9-.1 / Confirmed  Tubulovillous adenoma / SNOMED CT 35558857 / Confirmed      Histories   Past Medical History:    Active  Obstructive sleep apnea syndrome (815645588): Onset on 12/9/2013 at 48 years.  Tobacco abuse (305.1)  Hypertension goal BP (blood pressure) < 130/80 (5452916422)   Family History:    No family history items have been selected or recorded.   Procedure history:    Colonoscopy (SNOMED CT 811870087) on 5/1/2014 at 48 Years.  Comments:  5/13/2014 3:43 PM CDT - Clarissa Morales CMA  Indication: screening  Sedation: MAC (sleep apnea)  Tubular adenoma x 2, tubuvillious adenoma x 1  Repeat in 3 years  Polysomnogram (SNOMED CT 541282559) on 12/9/2013 at 48 Years.  Comments:  12/18/2013 7:38 PM [facility] - Treasure Noel CMA  AHI of 18.7/hr  with REM AHI of 54.5/hr.  CPAP titrated to +8cm H2o which reduced AHI to 1.4/hr and eliminated snoring.  Excision of lesion of skin (SNOMED CT 95700956) in the month of 4/2006 at 40 Years.  Appendectomy (SNOMED CT 696955933) in the month of 1/1985 at 19 Years.   Social History:        Alcohol Assessment            Current, occasional      Tobacco Assessment            Past      Physical Examination   Vital Signs   12/17/2018 3:41 PM CST Temperature Tympanic 98 DegF    Peripheral Pulse Rate 64 bpm    Pulse Site Radial artery    HR Method Manual    Systolic Blood Pressure 130 mmHg    Diastolic Blood Pressure 78 mmHg    Mean Arterial Pressure 95 mmHg    BP Site Left arm    BP Method Manual      Measurements from flowsheet : Measurements   12/17/2018 3:41 PM CST Height Measured - Standard 72 in    Weight Measured - Standard 245.6 lb    BSA 2.38 m2    Body Mass Index 33.31 kg/m2  HI      Eye:  Normal conjunctiva.    HENT:  Normocephalic, Oral mucosa is moist, No pharyngeal erythema.    Neck:  Supple, Non-tender, No carotid bruit, No lymphadenopathy, No thyromegaly.    Respiratory:  Lungs are clear to auscultation, Respirations are non-labored.    Cardiovascular:  Normal rate, Regular rhythm, Normal peripheral perfusion, No edema.    Gastrointestinal:  Soft, Non-tender.    Musculoskeletal:  Normal range of motion.    Integumentary:  Warm, Dry.    Neurologic:  Alert, Oriented.    Psychiatric:  Cooperative, Appropriate mood & affect.       Review / Management   Results review:  Lab results   12/14/2018 2:52 PM CST Sodium Level 139 mmol/L    Potassium Level 4.3 mmol/L    Chloride Level 104 mmol/L    CO2 Level 30 mmol/L    Glucose Level 89 mg/dL    BUN 16 mg/dL    Creatinine 1.10 mg/dL    BUN/Creat Ratio NOT APPLICABLE    eGFR 75 mL/min/1.73m2    eGFR African American 87 mL/min/1.73m2    Calcium Level 9.7 mg/dL   .       Impression and Plan   Diagnosis     Hypertension (ABD62-WN I10).     Course:  Progressing as expected,  Well controlled.    Plan:  Enroll in exercise program, Monitor blood pressure, Weight monitoring, continue current medication.         Diet: Low cholesterol, Sodium restricted.    Patient Instructions:       Counseled: Regarding treatment ( Hypertension ( Diet management, Medication management, Physical activity, Weight management ) ).

## 2022-04-11 ENCOUNTER — OFFICE VISIT (OUTPATIENT)
Dept: FAMILY MEDICINE | Facility: CLINIC | Age: 57
End: 2022-04-11
Payer: COMMERCIAL

## 2022-04-11 VITALS
BODY MASS INDEX: 34.28 KG/M2 | TEMPERATURE: 97.5 F | HEART RATE: 68 BPM | WEIGHT: 253.1 LBS | SYSTOLIC BLOOD PRESSURE: 124 MMHG | OXYGEN SATURATION: 98 % | DIASTOLIC BLOOD PRESSURE: 76 MMHG | HEIGHT: 72 IN

## 2022-04-11 DIAGNOSIS — I10 PRIMARY HYPERTENSION: Primary | ICD-10-CM

## 2022-04-11 DIAGNOSIS — R06.09 DYSPNEA ON EXERTION: ICD-10-CM

## 2022-04-11 DIAGNOSIS — G57.01 SCIATIC MONONEUROPATHY, RIGHT: ICD-10-CM

## 2022-04-11 PROCEDURE — 99214 OFFICE O/P EST MOD 30 MIN: CPT | Performed by: FAMILY MEDICINE

## 2022-04-11 PROCEDURE — 93000 ELECTROCARDIOGRAM COMPLETE: CPT | Performed by: FAMILY MEDICINE

## 2022-04-11 RX ORDER — LISINOPRIL 10 MG/1
10 TABLET ORAL DAILY
Qty: 90 TABLET | Refills: 3 | Status: SHIPPED | OUTPATIENT
Start: 2022-04-11 | End: 2023-02-16

## 2022-04-11 RX ORDER — LISINOPRIL 10 MG/1
10 TABLET ORAL DAILY
COMMUNITY
Start: 2021-03-22 | End: 2022-04-11

## 2022-04-11 NOTE — PROGRESS NOTES
Assessment & Plan     Primary hypertension  Hypertension is under control.  He has had recent laboratory work done to work with normal BMP.  Continue with current dose of lisinopril  - lisinopril (ZESTRIL) 10 MG tablet; Take 1 tablet (10 mg) by mouth in the morning.    Dyspnea on exertion  Symptoms have been worsening in the last 6 months.  Evidence today of significant rhythm abnormality.  Obtain stress echocardiogram for further evaluation we will follow up when results are available  - EKG 12-lead complete w/read - Clinics  - Echocardiogram Exercise Stress; Future    Sciatic mononeuropathy, right  Observe for the next week to 10 days and if no improvement consider prednisone versus formal physical therapy           BMI:   Estimated body mass index is 34.33 kg/m  as calculated from the following:    Height as of this encounter: 1.829 m (6').    Weight as of this encounter: 114.8 kg (253 lb 1.6 oz).   Weight management plan: Discussed healthy diet and exercise guidelines        No follow-ups on file.    Royer Treadwell MD  [facility]    Subjective   Yunior is a 56 year old who presents for the following health issues     History of Present Illness       Hypertension: He presents for follow up of hypertension.  He does not check blood pressure  regularly outside of the clinic. Outpatient blood pressures have not been over 140/90. He does not follow a low salt diet.     He eats 2-3 servings of fruits and vegetables daily.He consumes 1 sweetened beverage(s) daily.He exercises with enough effort to increase his heart rate 10 to 19 minutes per day.  He exercises with enough effort to increase his heart rate 4 days per week.   He is taking medications regularly.       Hypertension Follow-up      Do you check your blood pressure regularly outside of the clinic? No     Are you following a low salt diet? Yes    Are your blood pressures ever more than 140 on the top number (systolic) OR  more   than 90 on the bottom number (diastolic), for example 140/90? No    Patient is also complaining of intermittent sciatica which has been worse in the last week.  He has difficulty lying flat.  He has pain in the S1 distribution on the right.  It radiates down into his ankle.  He finds it is worse also with long periods of driving.    Patient reports dyspnea on exertion on 2 flights of stairs.  Over the last 6 months he has noted increasing trouble with the symptoms.  He avoids activities due to the dyspnea.  He has a family history of atrial fibrillation and heart disease.  He regularly has blood work done through his employer and is lipids have been fairly good as well as his chemistries.    Questions about factor V Leiden.  He has a niece who has had some troubles with headaches and during evaluation she was found to be positive for factor V Leiden.  He wonders if he needs to be checked for it.  His mother is heterozygous for factor V Leiden his father does not have a copy.      Review of Systems   Constitutional, HEENT, cardiovascular, pulmonary, gi and gu systems are negative, except as otherwise noted.      Objective    /76 (BP Location: Right arm)   Pulse 68   Temp 97.5  F (36.4  C) (Tympanic)   Ht 1.829 m (6')   Wt 114.8 kg (253 lb 1.6 oz)   SpO2 98%   BMI 34.33 kg/m    Body mass index is 34.33 kg/m .  Physical Exam   GENERAL: healthy, alert and no distress  NECK: no adenopathy, no asymmetry, masses, or scars and thyroid normal to palpation  RESP: lungs clear to auscultation - no rales, rhonchi or wheezes  CV: regular rate and rhythm, normal S1 S2, no S3 or S4, no murmur, click or rub, no peripheral edema and peripheral pulses strong  ABDOMEN: soft, nontender, no hepatosplenomegaly, no masses and bowel sounds normal  MS: no gross musculoskeletal defects noted, no edema    EKG - Reviewed and interpreted by me appears normal, NSR, sinus bradycardia, left axis deviation, no LVH by voltage  criteria

## 2023-02-16 ENCOUNTER — OFFICE VISIT (OUTPATIENT)
Dept: FAMILY MEDICINE | Facility: CLINIC | Age: 58
End: 2023-02-16
Payer: COMMERCIAL

## 2023-02-16 VITALS
BODY MASS INDEX: 35.11 KG/M2 | HEART RATE: 71 BPM | HEIGHT: 72 IN | OXYGEN SATURATION: 99 % | TEMPERATURE: 99.1 F | RESPIRATION RATE: 24 BRPM | SYSTOLIC BLOOD PRESSURE: 116 MMHG | DIASTOLIC BLOOD PRESSURE: 76 MMHG | WEIGHT: 259.2 LBS

## 2023-02-16 DIAGNOSIS — I10 PRIMARY HYPERTENSION: ICD-10-CM

## 2023-02-16 DIAGNOSIS — R05.1 ACUTE COUGH: Primary | ICD-10-CM

## 2023-02-16 PROCEDURE — 36415 COLL VENOUS BLD VENIPUNCTURE: CPT | Performed by: FAMILY MEDICINE

## 2023-02-16 PROCEDURE — 80048 BASIC METABOLIC PNL TOTAL CA: CPT | Performed by: FAMILY MEDICINE

## 2023-02-16 PROCEDURE — 99214 OFFICE O/P EST MOD 30 MIN: CPT | Performed by: FAMILY MEDICINE

## 2023-02-16 RX ORDER — AZITHROMYCIN 250 MG/1
TABLET, FILM COATED ORAL
Qty: 6 TABLET | Refills: 0 | Status: SHIPPED | OUTPATIENT
Start: 2023-02-16 | End: 2023-02-21

## 2023-02-16 RX ORDER — PREDNISONE 20 MG/1
20 TABLET ORAL DAILY
Qty: 7 TABLET | Refills: 0 | Status: SHIPPED | OUTPATIENT
Start: 2023-02-16 | End: 2023-02-23

## 2023-02-16 RX ORDER — BENZONATATE 200 MG/1
200 CAPSULE ORAL 3 TIMES DAILY PRN
Qty: 21 CAPSULE | Refills: 0 | Status: SHIPPED | OUTPATIENT
Start: 2023-02-16 | End: 2023-02-23

## 2023-02-16 RX ORDER — LISINOPRIL 10 MG/1
10 TABLET ORAL DAILY
Qty: 90 TABLET | Refills: 3 | Status: SHIPPED | OUTPATIENT
Start: 2023-02-16 | End: 2024-03-28

## 2023-02-16 NOTE — LETTER
February 20, 2023      Yunior Kirk  [address] [place] [address]        Dear ,    We are writing to inform you of your test results.    Your test results fall within the expected range(s) or remain unchanged from previous results.  Please continue with current treatment plan.    Resulted Orders   Basic metabolic panel   Result Value Ref Range    Sodium 141 136 - 145 mmol/L    Potassium 4.8 3.4 - 5.3 mmol/L    Chloride 106 98 - 107 mmol/L    Carbon Dioxide (CO2) 25 22 - 29 mmol/L    Anion Gap 10 7 - 15 mmol/L    Urea Nitrogen 12.9 8.0 - 23.0 mg/dL    Creatinine 1.21 (H) 0.67 - 1.17 mg/dL    Calcium 9.0 8.6 - 10.0 mg/dL    Glucose 95 70 - 99 mg/dL    GFR Estimate 69 >60 mL/min/1.73m2      Comment:      eGFR calculated using 2021 CKD-EPI equation.       If you have any questions or concerns, please call the clinic at the number listed above.       Sincerely,      Royer Treadwell MD

## 2023-02-16 NOTE — PROGRESS NOTES
Assessment & Plan     Primary hypertension  Controlled, continue current medication  - lisinopril (ZESTRIL) 10 MG tablet; Take 1 tablet (10 mg) by mouth daily  - Basic metabolic panel    Acute cough  Treat with azithromycin prednisone and benzonatate.  He will follow-up if symptoms not improved.  Suspect early pneumonia  - azithromycin (ZITHROMAX) 250 MG tablet; Take 2 tablets (500 mg) by mouth daily for 1 day, THEN 1 tablet (250 mg) daily for 4 days.  - predniSONE (DELTASONE) 20 MG tablet; Take 1 tablet (20 mg) by mouth daily for 7 days  - benzonatate (TESSALON) 200 MG capsule; Take 1 capsule (200 mg) by mouth 3 times daily as needed for cough             BMI:   Estimated body mass index is 35.15 kg/m  as calculated from the following:    Height as of this encounter: 1.829 m (6').    Weight as of this encounter: 117.6 kg (259 lb 3.2 oz).   Weight management plan: Discussed healthy diet and exercise guidelines        No follow-ups on file.    Royer Treadwell MD  [facility]    Adela Mojica is a 57 year old accompanied by his self, presenting for the following health issues:  Cough (C/o cough, congestion) and Hypertension (HTN follow up)      History of Present Illness       Reason for visit:  Cough  Symptom onset:  1-3 days ago    He eats 2-3 servings of fruits and vegetables daily.He consumes 1 sweetened beverage(s) daily.He exercises with enough effort to increase his heart rate 10 to 19 minutes per day.  He exercises with enough effort to increase his heart rate 5 days per week.   He is taking medications regularly.       Acute Illness  Acute illness concerns: cough  Onset/Duration: 4-5 days  Symptoms:  Fever: No  Chills/Sweats: No  Headache (location?): YES  Sinus Pressure: YES  Conjunctivitis:  No  Ear Pain: no  Rhinorrhea: YES  Congestion: YES  Sore Throat: YES  Cough: YES-non-productive, worsening over time  Wheeze: No  Decreased Appetite: YES  Nausea: No  Vomiting:  No  Diarrhea: No  Dysuria/Freq.: No  Dysuria or Hematuria: No  Fatigue/Achiness: YES- fatigue  Sick/Strep Exposure: YES- wife, children, mother in law  Therapies tried and outcome: DayQuil, NyQuil, Robitussin    Hypertension Follow-up      Do you check your blood pressure regularly outside of the clinic? Yes     Are you following a low salt diet? No    Are your blood pressures ever more than 140 on the top number (systolic) OR more   than 90 on the bottom number (diastolic), for example 140/90? No      Review of Systems   Constitutional, HEENT, cardiovascular, pulmonary, gi and gu systems are negative, except as otherwise noted.      Objective    /76 (BP Location: Right arm, Patient Position: Sitting, Cuff Size: Adult Large)   Pulse 71   Temp 99.1  F (37.3  C) (Tympanic)   Resp 24   Ht 1.829 m (6')   Wt 117.6 kg (259 lb 3.2 oz)   SpO2 99%   BMI 35.15 kg/m    Body mass index is 35.15 kg/m .  Physical Exam   Alert, oriented, no acute distress  Ear canals are patent, TMs clear  Throat is clear  Neck is supple without adenopathy  Lungs have decreased breath sounds at the bases with crackles on the left  Heart has a regular rate and rhythm

## 2023-02-17 LAB
ANION GAP SERPL CALCULATED.3IONS-SCNC: 10 MMOL/L (ref 7–15)
BUN SERPL-MCNC: 12.9 MG/DL (ref 8–23)
CALCIUM SERPL-MCNC: 9 MG/DL (ref 8.6–10)
CHLORIDE SERPL-SCNC: 106 MMOL/L (ref 98–107)
CREAT SERPL-MCNC: 1.21 MG/DL (ref 0.67–1.17)
DEPRECATED HCO3 PLAS-SCNC: 25 MMOL/L (ref 22–29)
GFR SERPL CREATININE-BSD FRML MDRD: 69 ML/MIN/1.73M2
GLUCOSE SERPL-MCNC: 95 MG/DL (ref 70–99)
POTASSIUM SERPL-SCNC: 4.8 MMOL/L (ref 3.4–5.3)
SODIUM SERPL-SCNC: 141 MMOL/L (ref 136–145)

## 2023-03-14 ENCOUNTER — OFFICE VISIT (OUTPATIENT)
Dept: FAMILY MEDICINE | Facility: CLINIC | Age: 58
End: 2023-03-14
Payer: COMMERCIAL

## 2023-03-14 ENCOUNTER — NURSE TRIAGE (OUTPATIENT)
Dept: FAMILY MEDICINE | Facility: CLINIC | Age: 58
End: 2023-03-14

## 2023-03-14 VITALS
DIASTOLIC BLOOD PRESSURE: 72 MMHG | WEIGHT: 265 LBS | HEIGHT: 71 IN | TEMPERATURE: 96.6 F | BODY MASS INDEX: 37.1 KG/M2 | RESPIRATION RATE: 24 BRPM | HEART RATE: 72 BPM | OXYGEN SATURATION: 97 % | SYSTOLIC BLOOD PRESSURE: 136 MMHG

## 2023-03-14 DIAGNOSIS — J01.00 ACUTE NON-RECURRENT MAXILLARY SINUSITIS: Primary | ICD-10-CM

## 2023-03-14 PROCEDURE — 99213 OFFICE O/P EST LOW 20 MIN: CPT | Performed by: PHYSICIAN ASSISTANT

## 2023-03-14 RX ORDER — PREDNISONE 20 MG/1
TABLET ORAL
Qty: 14 TABLET | Refills: 0 | Status: SHIPPED | OUTPATIENT
Start: 2023-03-14 | End: 2023-03-26

## 2023-03-14 ASSESSMENT — ENCOUNTER SYMPTOMS
COUGH: 1
FEVER: 1
SINUS PRESSURE: 1
FATIGUE: 1
CHILLS: 1
SORE THROAT: 1
HEARTBURN: 0

## 2023-03-14 NOTE — PROGRESS NOTES
1. Acute non-recurrent maxillary sinusitis  Will treat as sinusitis with augmentin, and prednisone taper.  Recommended he get Mucinex D (with sudafed), heat over the sinuses, saline nasal rinses, follow up if not improving    - amoxicillin-clavulanate (AUGMENTIN) 875-125 MG tablet; Take 1 tablet by mouth 2 times daily for 10 days  Dispense: 20 tablet; Refill: 0  - predniSONE (DELTASONE) 20 MG tablet; Take 2 tablets (40 mg) by mouth daily for 4 days, THEN 1 tablet (20 mg) daily for 4 days, THEN 0.5 tablets (10 mg) daily for 4 days.  Dispense: 14 tablet; Refill: 0      Adela Mojica is a 57 year old accompanied by his self, presenting for the following health issues:  Cough and Pharyngitis (Pt here for Follow-up on productive cough,ST and sinus pressure from 2/16.  Pt states sx are not getting any better.)      Cough  Associated symptoms include chills and sore throat.   Pharyngitis   Associated symptoms include congestion and cough (productive).   History of Present Illness       Reason for visit:  Cough and sore throatHe consumes 1 sweetened beverage(s) daily.He exercises with enough effort to increase his heart rate 10 to 19 minutes per day.  He exercises with enough effort to increase his heart rate 3 or less days per week.   He is taking medications regularly.     He was seen on 2/16 for cough, treated for pneumonia with zpak, prednisone, and bezonatate  Cough is not as painful but still hacking and now has more sinus sxs  Cough is keeping him up at night and hard to cough while wearing his CPAP     Using dayquil and nyquil  No heartburn        Review of Systems   Constitutional: Positive for chills, fatigue and fever.   HENT: Positive for congestion, sinus pressure and sore throat.    Respiratory: Positive for cough (productive).    Gastrointestinal: Negative for heartburn.            Objective    /72 (BP Location: Right arm, Patient Position: Sitting, Cuff Size: Adult Large)   Pulse 72   Temp (!)  "96.6  F (35.9  C) (Tympanic)   Resp 24   Ht 1.803 m (5' 11\")   Wt 120.2 kg (265 lb)   SpO2 97%   BMI 36.96 kg/m    Body mass index is 36.96 kg/m .  Physical Exam  Vitals reviewed.   Constitutional:       Appearance: Normal appearance.   HENT:      Head:      Comments: Maxillary and frontal sinus tenderness     Right Ear: Tympanic membrane normal.      Left Ear: Tympanic membrane normal.      Nose: Nose normal.      Mouth/Throat:      Mouth: Mucous membranes are moist.      Pharynx: Oropharynx is clear. Posterior oropharyngeal erythema present.   Cardiovascular:      Rate and Rhythm: Normal rate and regular rhythm.      Pulses: Normal pulses.      Heart sounds: Normal heart sounds.   Pulmonary:      Effort: Pulmonary effort is normal.      Breath sounds: Rhonchi (slight ) present. No wheezing.      Comments: Good airflow  Musculoskeletal:      Cervical back: Normal range of motion. Tenderness present.   Lymphadenopathy:      Cervical: No cervical adenopathy.   Neurological:      Mental Status: He is alert.                            "

## 2023-03-14 NOTE — TELEPHONE ENCOUNTER
Nurse Triage SBAR    Is this a 2nd Level Triage? YES, LICENSED PRACTITIONER REVIEW IS REQUIRED    Situation: Patient calling to seek guidance.  Background: Patient seen 2/16/23 by Dr. Treadwell, diagnosed with pneumonia, prescribed:  Azithromycin 250 MG (5 day course), Benzonatate 200 mg TID PRN, and predniSone 20 MG daily (7 day course)    Patient states cough never went away.  The pain from coughing subsided, turned into a sinus infection per patient.  Patient states he self treated with DayQuil/NyQuil, and sinus symptoms have since gone away.    Assessment: Harsh, strong intermittent cough present since 2/16/23.    Denies fever, shortness of breath, difficulty breathing, chest pain.    Protocol Recommended Disposition:   See in Office Today    Recommendation:   RN relayed protocol recommendation.  Assisted with booking same day appointment with a provider.  Recommended taking a home covid test prior to appointment.  If positive, advised patient to call back and ask to speak with nurse triage.       Reason for Disposition    Continuous (nonstop) coughing interferes with work or school and no improvement using cough treatment per Care Advice    Additional Information    Negative: SEVERE difficulty breathing (e.g., struggling for each breath, speaks in single words)    Negative: Lips or face are bluish now and persists when not coughing    Negative: Sounds like a life-threatening emergency to the triager    Negative: Chest pain is main symptom    Negative: Cough and < 3 weeks duration    Negative: Previous asthma attacks and this feels like an asthma attack    Negative: MODERATE difficulty breathing (e.g., speaks in phrases, SOB even at rest, pulse 100-120) and still present when not coughing    Negative: Chest pain  (Exception: MILD central chest pain, present only when coughing.)    Negative: Increasing difficulty breathing and always has some difficulty breathing    Negative: Patient sounds very sick or weak to  the triager    Negative: MILD difficulty breathing (e.g., minimal/no SOB at rest, SOB with walking, pulse < 100) and still present when not coughing  (Exception: No change from usual, chronic shortness of breath.)    Negative: Coughed up blood and > 1 tablespoon (15 ml)  (Exception: Blood-tinged sputum.)    Negative: Fever > 103 F (39.4 C)    Negative: Fever > 101 F (38.3 C) and age > 60 years    Negative: Fever > 100.0 F (37.8 C) and bedridden (e.g., nursing home patient, CVA, chronic illness, recovering from surgery)    Negative: Fever > 100.0 F (37.8 C) and diabetes mellitus or weak immune system (e.g., HIV positive, cancer chemo, splenectomy, organ transplant, chronic steroids)    Negative: SEVERE coughing spells (e.g., whooping sound after coughing, vomiting after coughing)    Protocols used: COUGH - CHRONIC-A-OH

## 2023-04-19 ENCOUNTER — OFFICE VISIT (OUTPATIENT)
Dept: FAMILY MEDICINE | Facility: CLINIC | Age: 58
End: 2023-04-19
Payer: COMMERCIAL

## 2023-04-19 ENCOUNTER — ANCILLARY PROCEDURE (OUTPATIENT)
Dept: GENERAL RADIOLOGY | Facility: CLINIC | Age: 58
End: 2023-04-19
Attending: FAMILY MEDICINE
Payer: COMMERCIAL

## 2023-04-19 VITALS
TEMPERATURE: 97.4 F | HEIGHT: 71 IN | HEART RATE: 75 BPM | RESPIRATION RATE: 28 BRPM | DIASTOLIC BLOOD PRESSURE: 89 MMHG | SYSTOLIC BLOOD PRESSURE: 148 MMHG | OXYGEN SATURATION: 99 % | WEIGHT: 261.2 LBS | BODY MASS INDEX: 36.57 KG/M2

## 2023-04-19 DIAGNOSIS — R05.3 CHRONIC COUGH: Primary | ICD-10-CM

## 2023-04-19 DIAGNOSIS — R05.3 CHRONIC COUGH: ICD-10-CM

## 2023-04-19 DIAGNOSIS — M72.2 PLANTAR FASCIITIS: ICD-10-CM

## 2023-04-19 PROCEDURE — 99213 OFFICE O/P EST LOW 20 MIN: CPT | Performed by: FAMILY MEDICINE

## 2023-04-19 PROCEDURE — 71046 X-RAY EXAM CHEST 2 VIEWS: CPT | Mod: TC | Performed by: RADIOLOGY

## 2023-04-19 RX ORDER — MOMETASONE FUROATE MONOHYDRATE 50 UG/1
2 SPRAY, METERED NASAL DAILY
Qty: 17 G | Refills: 1 | Status: SHIPPED | OUTPATIENT
Start: 2023-04-19 | End: 2024-01-10

## 2023-04-19 NOTE — PROGRESS NOTES
Assessment & Plan     Chronic cough  Chronic cough most likely due to postnasal drainage.  Chest x-ray appears normal.  Consideration for ACE inhibitor cough as well if he has no improvement over the next couple weeks.  - XR Chest 2 Views; Future  - mometasone (NASONEX) 50 MCG/ACT nasal spray; Spray 2 sprays into both nostrils daily    Plantar fasciitis  He will continue with stretching and we have discussed taping as well.  If no improvement he will follow-up                   Royer Treadwell MD  [facility]    Adela Mojica is a 57 year old, presenting for the following health issues:  Sinus Problem (Possible sinus infection and cough)        4/19/2023    11:45 AM   Additional Questions   Roomed by Snow BALBUENA CMA   Accompanied by SINDHU         4/19/2023    11:45 AM   Patient Reported Additional Medications   Patient reports taking the following new medications None     History of Present Illness       Reason for visit:  Sinus infection and cough    He eats 0-1 servings of fruits and vegetables daily.He consumes 1 sweetened beverage(s) daily.He exercises with enough effort to increase his heart rate 10 to 19 minutes per day.  He exercises with enough effort to increase his heart rate 4 days per week.   He is taking medications regularly.       Acute Illness  Acute illness concerns: sinus infection  Onset/Duration: on/off x2-3 weeks  Symptoms:  Fever: No  Chills/Sweats: No  Headache (location?): YES- sinus headaches  Sinus Pressure: YES  Conjunctivitis:  No  Ear Pain: no  Rhinorrhea: YES  Congestion: YES  Sore Throat: NO  Cough: YES-productive of clear sputum  Wheeze: No  Decreased Appetite: No  Nausea: No  Vomiting: No  Diarrhea: No  Dysuria/Freq.: No  Dysuria or Hematuria: No  Fatigue/Achiness: YES- fatigue  Sick/Strep Exposure: No  Therapies tried and outcome: was on antibiotics 2-3 weeks ago    He has also had trouble with planter fasciitis over the last several months.  He has  "tried new insoles and he has been stretching as well.      Review of Systems   Constitutional, HEENT, cardiovascular, pulmonary, gi and gu systems are negative, except as otherwise noted.      Objective    BP (!) 148/89 (BP Location: Right arm, Patient Position: Sitting, Cuff Size: Adult Large)   Pulse 75   Temp 97.4  F (36.3  C) (Tympanic)   Resp 28   Ht 1.803 m (5' 11\")   Wt 118.5 kg (261 lb 3.2 oz)   SpO2 99%   BMI 36.43 kg/m    Body mass index is 36.43 kg/m .  Physical Exam   GENERAL: healthy, alert and no distress  HENT: ear canals and TM's normal, nose and mouth without ulcers or lesions, PND present  NECK: no adenopathy, no asymmetry, masses, or scars and thyroid normal to palpation  RESP: lungs clear to auscultation - no rales, rhonchi or wheezes  CV: regular rate and rhythm, normal S1 S2, no S3 or S4, no murmur, click or rub, no peripheral edema and peripheral pulses strong  MS: no gross musculoskeletal defects noted, no edema  PSYCH: mentation appears normal, affect normal/bright                    "

## 2023-09-06 ENCOUNTER — OFFICE VISIT (OUTPATIENT)
Dept: PODIATRY | Facility: CLINIC | Age: 58
End: 2023-09-06
Payer: COMMERCIAL

## 2023-09-06 VITALS
TEMPERATURE: 98.2 F | SYSTOLIC BLOOD PRESSURE: 159 MMHG | DIASTOLIC BLOOD PRESSURE: 94 MMHG | HEART RATE: 66 BPM | OXYGEN SATURATION: 65 % | RESPIRATION RATE: 12 BRPM

## 2023-09-06 DIAGNOSIS — M72.2 PLANTAR FASCIITIS: Primary | ICD-10-CM

## 2023-09-06 PROCEDURE — 20550 NJX 1 TENDON SHEATH/LIGAMENT: CPT | Mod: RT | Performed by: PODIATRIST

## 2023-09-06 PROCEDURE — 99203 OFFICE O/P NEW LOW 30 MIN: CPT | Mod: 25 | Performed by: PODIATRIST

## 2023-09-06 RX ORDER — DEXAMETHASONE SODIUM PHOSPHATE 4 MG/ML
4 INJECTION, SOLUTION INTRA-ARTICULAR; INTRALESIONAL; INTRAMUSCULAR; INTRAVENOUS; SOFT TISSUE ONCE
Status: COMPLETED | OUTPATIENT
Start: 2023-09-06 | End: 2023-09-06

## 2023-09-06 RX ORDER — LIDOCAINE HYDROCHLORIDE 20 MG/ML
1 INJECTION, SOLUTION INFILTRATION; PERINEURAL ONCE
Status: COMPLETED | OUTPATIENT
Start: 2023-09-06 | End: 2023-09-06

## 2023-09-06 RX ADMIN — LIDOCAINE HYDROCHLORIDE 1 ML: 20 INJECTION, SOLUTION INFILTRATION; PERINEURAL at 09:41

## 2023-09-06 RX ADMIN — DEXAMETHASONE SODIUM PHOSPHATE 4 MG: 4 INJECTION, SOLUTION INTRA-ARTICULAR; INTRALESIONAL; INTRAMUSCULAR; INTRAVENOUS; SOFT TISSUE at 09:41

## 2023-09-06 ASSESSMENT — PAIN SCALES - GENERAL: PAINLEVEL: MODERATE PAIN (4)

## 2023-09-06 NOTE — PATIENT INSTRUCTIONS
What are Prescription Custom Orthotics?  Custom orthotics are specially-made devices designed to support and comfort your feet. Prescription orthotics are crafted for you and no one else. They match the contours of your feet precisely and are designed for the way you move. Orthotics are only manufactured after a podiatrist has conducted a complete evaluation of your feet, ankles, and legs, so the orthotic can accommodate your unique foot structure and pathology.  Prescription orthotics are divided into two categories:  Functional orthotics are designed to control abnormal motion. They may be used to treat foot pain caused by abnormal motion; they can also be used to treat injuries such as shin splints or tendinitis. Functional orthotics are usually crafted of a semi-rigid material such as plastic or graphite.  Accommodative orthotics are softer and meant to provide additional cushioning and support. They can be used to treat diabetic foot ulcers, painful calluses on the bottom of the foot, and other uncomfortable conditions.  Podiatrists use orthotics to treat foot problems such as plantar fasciitis, bursitis, tendinitis, diabetic foot ulcers, and foot, ankle, and heel pain. Clinical research studies have shown that podiatrist-prescribed foot orthotics decrease foot pain and improve function.  Orthotics typically cost more than shoe inserts purchased in a retail store, but the additional cost is usually well worth it. Unlike shoe inserts, orthotics are molded to fit each individual foot, so you can be sure that your orthotics fit and do what they're supposed to do. Prescription orthotics are also made of top-notch materials and last many years when cared for properly. Insurance often helps pay for prescription orthotics.  What are Shoe Inserts?   You've seen them at the grocery store and at the mall. You've probably even seen them on TV and online. Shoe inserts are any kind of non-prescription foot support designed  to be worn inside a shoe. Pre-packaged, mass produced, arch supports are shoe inserts. So are the  custom-made  insoles and foot supports that you can order online or at retail stores. Unless the device has been prescribed by a doctor and crafted for your specific foot, it's a shoe insert, not a custom orthotic device--despite what the ads might say.  Shoe inserts can be very helpful for a variety of foot ailments, including flat arches and foot and leg pain. They can cushion your feet, provide comfort, and support your arches, but they can't correct biomechanical foot problems or cure long-standing foot issues.  The most common types of shoe inserts are:  Arch supports: Some people have high arches. Others have low arches or flat feet. Arch supports generally have a  bumped-up  appearance and are designed to support the foot's natural arch.   Insoles: Insoles slip into your shoe to provide extra cushioning and support. Insoles are often made of gel, foam, or plastic.   Heel liners: Heel liners, sometimes called heel pads or heel cups, provide extra cushioning in the heel region. They may be especially useful for patients who have foot pain caused by age-related thinning of the heels' natural fat pads.   Foot cushions: Do your shoes rub against your heel or your toes? Foot cushions come in many different shapes and sizes and can be used as a barrier between you and your shoe.  Choosing an Over-the-Counter Shoe Insert  Selecting a shoe insert from the wide variety of devices on the market can be overwhelming. Here are some podiatrist-tested tips to help you find the insert that best meets your needs:  Consider your health. Do you have diabetes? Problems with circulation? An over-the-counter insert may not be your best bet. Diabetes and poor circulation increase your risk of foot ulcers and infections, so schedule an appointment with a podiatrist. He or she can help you select a solution that won't cause additional  health problems.   Think about the purpose. Are you planning to run a marathon, or do you just need a little arch support in your work shoes? Look for a product that fits your planned level of activity.   Bring your shoes. For the insert to be effective, it has to fit into your shoes. So bring your sneakers, dress shoes, or work boots--whatever you plan to wear with your insert. Look for an insert that will fit the contours of your shoe.   Try them on. If all possible, slip the insert into your shoe and try it out. Walk around a little. How does it feel? Don't assume that feelings of pressure will go away with continued wear. (If you can't try the inserts at the store, ask about the store's return policy and hold on to your receipt.)    Please call one of the [place] locations below to schedule an appointment. If you received a prescription please bring it with you to your appointment. Some locations are limited to what they carry.    Office Locations    [facility]  [address] [place] [address]  Home Medical Equipment, [address]   Phone: [phone]   Orthotics and Prosthetics, [address]   Phone: [phone]    [facility]   Home Medical Equipment   [address], [place] [address]  Phone :[phone]  Orthotics and Prosthetics   [address], [address], [facility] [address]  Phone:[phone]          [facility] [place]  [address]   [place] [address]   Phone: [phone]    [facility]  [address]  [place] [address]  Phone: [phone]    [facility]   [address]  [place] [address]  Phone: [phone]    [facility]  [address]  [place] [address]  Phone:  [phone]    [facility]  [address]  [place] [address]  Phone: [phone]    [place]   [address] [place] [address]   Phone: [phone]    WEARING YOUR CUSTOM FOOT ORTHOTICS   Most insurance plans cover one pair of orthotics per year. You must check with your   insurance plan to see what your payment responsibility will be. Please call your   insurance company by calling the number on the back of your insurance card.   Orthotic's are non-refundable and non-returnable.   Orthotics are made of various designs. Some orthotics are covered with material that extends beyond your toes. If your orthotic is of this design, you will likely need to trim the toe end to get a proper fit. The insole from your shoe can be used as a template. Simply overlay the shoe insert on top of the custom orthotic. Align the heel end while tracing the length of the insert onto the custom orthotic. Use a large scissor to trim the toe end until you get a proper fit in the shoe.   The orthotic needs to be pushed as far back in the shoe as possible. The heel portion should not ride forward so as not to irritate your heel.   Orthotics are designed to work with socks. Excessive perspiration will shorten the life span of the orthotics. Remove the orthotic from the shoe frequently for proper drying.   The break-in period lasts for weeks. People new to orthotics will likely experience new aches and pains. The orthotic is forcing your foot into a new position. Arch, foot and leg muscle aches and fatigue are common during these weeks. Minor discomfort can be considered normal break in phenomenon. Start wearing your orthotic around your home your first day. Limited activity for one to two hours is recommended. You can increase one or two additional hours each day provided the aches and pains are subsiding. The degree of discomfort, fatigue and problems will dictate the speed of break  in. You may require multiple weeks to work up to full time use.   Do not continue wearing your orthotics if they are creating problems such as blisters or sores. Do not hesitate to call the clinic to speak with a nurse regarding orthotic   break in, fit, trimming, etc. You may also need to see the doctor if the orthotics are   simply not working out. Adjustments are sometimes made to improve orthotic   function.     Orthotics will only work in certain styles and types of shoes. Orthotics rarely work in dress shoes. Slip-ons, clogs, sandals and heels are particularly troublesome. Specially designed orthotics may be necessary for these types of shoes. Your custom orthotic was designed for activities that require appropriate walking or running shoes. Lace up athletic shoes, walking shoes or work boots should work appropriately. You may need a wider or longer shoe. Shoes with a removable  or insert work best. In general, you want to remove an insert from the shoe before placing the orthotic into the shoe. Shoes without a removable liner may not work as well.     When purchasing new shoes, bring your orthotics along to get a proper fit. Shop at stores that are familiar with orthotics.   Frequent washing of the orthotic may shorten the life span of the top cover. The top cover can be replaced but will generally last one to five years depending on use and foot perspiration.

## 2023-09-06 NOTE — PROGRESS NOTES
FOOT AND ANKLE SURGERY/PODIATRY CONSULT NOTE         ASSESSMENT:   Plantar fasciitis right foot      TREATMENT:  The patient was given a cortisone injection in the right heel today consisting of 1 cc of dexamethasone sodium phosphate and 1 cc of 2% lidocaine plain.  I have also recommended prescription orthotics.  I have asked the patient to return to the clinic in 1 week if his pain persist at which time I would recommend a second cortisone injection.        HPI: Yunior Kirk resented to the clinic today complaining of right heel pain.  The patient stated he has had this heel pain for approximately  5 months.  The pain is located on the bottom of the right heel and is aggravated with weightbearing and ambulation.  The pain is moderate to severe.  He denies any trauma to his right foot.  He has not had any associated redness or swelling.  The pain is more pronounced when he first gets out of bed in the mornings.  He has post static dyskinesia.  He has tried over-the-counter shoe inserts with very little relief.     No past medical history on file.    Social History     Socioeconomic History    Marital status:      Spouse name: Wendy    Number of children: Not on file    Years of education: Not on file    Highest education level: Not on file   Occupational History    Not on file   Tobacco Use    Smoking status: Former     Types: Cigarettes    Smokeless tobacco: Never   Vaping Use    Vaping Use: Never used   Substance and Sexual Activity    Alcohol use: Not Currently    Drug use: Never    Sexual activity: Not on file   Other Topics Concern    Not on file   Social History Narrative    Not on file     Social Determinants of Health     Financial Resource Strain: Not on file   Food Insecurity: Not on file   Transportation Needs: Not on file   Physical Activity: Not on file   Stress: Not on file   Social Connections: Not on file   Intimate Partner Violence: Not on file   Housing Stability: Not on file        No  Known Allergies       Current Outpatient Medications:     lisinopril (ZESTRIL) 10 MG tablet, Take 1 tablet (10 mg) by mouth daily, Disp: 90 tablet, Rfl: 3    mometasone (NASONEX) 50 MCG/ACT nasal spray, Spray 2 sprays into both nostrils daily, Disp: 17 g, Rfl: 1     No family history on file.     Social History     Socioeconomic History    Marital status:      Spouse name: Wendy    Number of children: Not on file    Years of education: Not on file    Highest education level: Not on file   Occupational History    Not on file   Tobacco Use    Smoking status: Former     Types: Cigarettes    Smokeless tobacco: Never   Vaping Use    Vaping Use: Never used   Substance and Sexual Activity    Alcohol use: Not Currently    Drug use: Never    Sexual activity: Not on file   Other Topics Concern    Not on file   Social History Narrative    Not on file     Social Determinants of Health     Financial Resource Strain: Not on file   Food Insecurity: Not on file   Transportation Needs: Not on file   Physical Activity: Not on file   Stress: Not on file   Social Connections: Not on file   Intimate Partner Violence: Not on file   Housing Stability: Not on file        Review of Systems - Patient denies fever, chills, rash, wound, stiffness, limping, numbness, weakness, heart burn, blood in stool, chest pain with activity, calf pain when walking, shortness of breath with activity, chronic cough, easy bleeding/bruising, swelling of ankles, excessive thirst, fatigue, depression, anxiety.  Patient admits to right heel pain.      OBJECTIVE:  Appearance: alert, well appearing, and in no distress.    BP (!) 152/73   Pulse 66   Temp 98.2  F (36.8  C)   Resp 12   SpO2 (!) 65%      There is no height or weight on file to calculate BMI.     General appearance: Patient is alert and fully cooperative with history & exam.  No sign of distress is noted during the visit.  Psychiatric: Affect is pleasant & appropriate.  Patient appears  motivated to improve health.  Respiratory: Breathing is regular & unlabored while sitting.  HEENT: Hearing is intact to spoken word.  Speech is clear.  No gross evidence of visual impairment that would impact ambulation.    Vascular: Dorsalis pedis and posterior tibial pulses are palpable. There is no pedal hair growth bilaterally.  CFT < 3 sec from anterior tibial surface to distal digits bilaterally. There is no appreciable edema noted.  Dermatologic: Turgor and texture are within normal limits. No coloration or temperature changes. No primary or secondary lesions noted.  Neurologic: All epicritic and proprioceptive sensations are grossly intact bilaterally.  Musculoskeletal: All active and passive ankle, subtalar, midtarsal, and 1st MPJ range of motion are grossly intact without pain or crepitus, with the exception of none. Manual muscle strength is within normal limits bilaterally. All dorsiflexors, plantarflexors, invertors, evertors are intact bilaterally. Tenderness present to the plantar medial aspect the right heel on palpation.  No tenderness to the right foot or ankle with range of motion. Calf is soft/non-tender without warmth/induration    Imaging:       No images are attached to the encounter or orders placed in the encounter.     No results found.   No results found.     Sergio Garnica DPM  [facility] Foot & Ankle Surgery/Podiatry

## 2023-09-06 NOTE — LETTER
9/6/2023         RE: Yunior MONTAGUE Marilevi  [address] [address]        Dear Colleague,    Thank you for referring your patient, Yunior Kirk, to the [facility]. Please see a copy of my visit note below.    FOOT AND ANKLE SURGERY/PODIATRY CONSULT NOTE         ASSESSMENT:   Plantar fasciitis right foot      TREATMENT:  The patient was given a cortisone injection in the right heel today consisting of 1 cc of dexamethasone sodium phosphate and 1 cc of 2% lidocaine plain.  I have also recommended prescription orthotics.  I have asked the patient to return to the clinic in 1 week if his pain persist at which time I would recommend a second cortisone injection.        HPI: Yunior Kirk resented to the clinic today complaining of right heel pain.  The patient stated he has had this heel pain for approximately  5 months.  The pain is located on the bottom of the right heel and is aggravated with weightbearing and ambulation.  The pain is moderate to severe.  He denies any trauma to his right foot.  He has not had any associated redness or swelling.  The pain is more pronounced when he first gets out of bed in the mornings.  He has post static dyskinesia.  He has tried over-the-counter shoe inserts with very little relief.     No past medical history on file.    Social History     Socioeconomic History     Marital status:      Spouse name: Wendy     Number of children: Not on file     Years of education: Not on file     Highest education level: Not on file   Occupational History     Not on file   Tobacco Use     Smoking status: Former     Types: Cigarettes     Smokeless tobacco: Never   Vaping Use     Vaping Use: Never used   Substance and Sexual Activity     Alcohol use: Not Currently     Drug use: Never     Sexual activity: Not on file   Other Topics Concern     Not on file   Social History Narrative     Not on file     Social Determinants of Health     Financial  Resource Strain: Not on file   Food Insecurity: Not on file   Transportation Needs: Not on file   Physical Activity: Not on file   Stress: Not on file   Social Connections: Not on file   Intimate Partner Violence: Not on file   Housing Stability: Not on file        No Known Allergies       Current Outpatient Medications:      lisinopril (ZESTRIL) 10 MG tablet, Take 1 tablet (10 mg) by mouth daily, Disp: 90 tablet, Rfl: 3     mometasone (NASONEX) 50 MCG/ACT nasal spray, Spray 2 sprays into both nostrils daily, Disp: 17 g, Rfl: 1     No family history on file.     Social History     Socioeconomic History     Marital status:      Spouse name: Wendy     Number of children: Not on file     Years of education: Not on file     Highest education level: Not on file   Occupational History     Not on file   Tobacco Use     Smoking status: Former     Types: Cigarettes     Smokeless tobacco: Never   Vaping Use     Vaping Use: Never used   Substance and Sexual Activity     Alcohol use: Not Currently     Drug use: Never     Sexual activity: Not on file   Other Topics Concern     Not on file   Social History Narrative     Not on file     Social Determinants of Health     Financial Resource Strain: Not on file   Food Insecurity: Not on file   Transportation Needs: Not on file   Physical Activity: Not on file   Stress: Not on file   Social Connections: Not on file   Intimate Partner Violence: Not on file   Housing Stability: Not on file        Review of Systems - Patient denies fever, chills, rash, wound, stiffness, limping, numbness, weakness, heart burn, blood in stool, chest pain with activity, calf pain when walking, shortness of breath with activity, chronic cough, easy bleeding/bruising, swelling of ankles, excessive thirst, fatigue, depression, anxiety.  Patient admits to right heel pain.      OBJECTIVE:  Appearance: alert, well appearing, and in no distress.    BP (!) 152/73   Pulse 66   Temp 98.2  F (36.8  C)    Resp 12   SpO2 (!) 65%      There is no height or weight on file to calculate BMI.     General appearance: Patient is alert and fully cooperative with history & exam.  No sign of distress is noted during the visit.  Psychiatric: Affect is pleasant & appropriate.  Patient appears motivated to improve health.  Respiratory: Breathing is regular & unlabored while sitting.  HEENT: Hearing is intact to spoken word.  Speech is clear.  No gross evidence of visual impairment that would impact ambulation.    Vascular: Dorsalis pedis and posterior tibial pulses are palpable. There is no pedal hair growth bilaterally.  CFT < 3 sec from anterior tibial surface to distal digits bilaterally. There is no appreciable edema noted.  Dermatologic: Turgor and texture are within normal limits. No coloration or temperature changes. No primary or secondary lesions noted.  Neurologic: All epicritic and proprioceptive sensations are grossly intact bilaterally.  Musculoskeletal: All active and passive ankle, subtalar, midtarsal, and 1st MPJ range of motion are grossly intact without pain or crepitus, with the exception of none. Manual muscle strength is within normal limits bilaterally. All dorsiflexors, plantarflexors, invertors, evertors are intact bilaterally. Tenderness present to the plantar medial aspect the right heel on palpation.  No tenderness to the right foot or ankle with range of motion. Calf is soft/non-tender without warmth/induration    Imaging:       No images are attached to the encounter or orders placed in the encounter.     No results found.   No results found.     Sergio Garnica DPM  [facility] Foot & Ankle Surgery/Podiatry       Again, thank you for allowing me to participate in the care of your patient.        Sincerely,        Sergio Metzger DPM

## 2024-01-09 DIAGNOSIS — G47.33 OBSTRUCTIVE SLEEP APNEA (ADULT) (PEDIATRIC): Primary | ICD-10-CM

## 2024-01-10 ENCOUNTER — OFFICE VISIT (OUTPATIENT)
Dept: SLEEP MEDICINE | Facility: CLINIC | Age: 59
End: 2024-01-10
Payer: COMMERCIAL

## 2024-01-10 VITALS
OXYGEN SATURATION: 96 % | HEIGHT: 71 IN | SYSTOLIC BLOOD PRESSURE: 134 MMHG | DIASTOLIC BLOOD PRESSURE: 83 MMHG | RESPIRATION RATE: 18 BRPM | BODY MASS INDEX: 36.26 KG/M2 | HEART RATE: 73 BPM | WEIGHT: 259 LBS

## 2024-01-10 DIAGNOSIS — R06.83 SNORING: ICD-10-CM

## 2024-01-10 DIAGNOSIS — G47.33 OBSTRUCTIVE SLEEP APNEA (ADULT) (PEDIATRIC): Primary | ICD-10-CM

## 2024-01-10 PROBLEM — E66.01 CLASS 2 SEVERE OBESITY DUE TO EXCESS CALORIES WITH SERIOUS COMORBIDITY IN ADULT (H): Status: ACTIVE | Noted: 2024-01-10

## 2024-01-10 PROBLEM — E66.812 CLASS 2 SEVERE OBESITY DUE TO EXCESS CALORIES WITH SERIOUS COMORBIDITY IN ADULT (H): Status: ACTIVE | Noted: 2024-01-10

## 2024-01-10 PROCEDURE — 99203 OFFICE O/P NEW LOW 30 MIN: CPT | Performed by: INTERNAL MEDICINE

## 2024-01-10 ASSESSMENT — SLEEP AND FATIGUE QUESTIONNAIRES
HOW LIKELY ARE YOU TO NOD OFF OR FALL ASLEEP WHILE SITTING QUIETLY AFTER LUNCH WITHOUT ALCOHOL: WOULD NEVER DOZE
HOW LIKELY ARE YOU TO NOD OFF OR FALL ASLEEP WHILE SITTING INACTIVE IN A PUBLIC PLACE: WOULD NEVER DOZE
HOW LIKELY ARE YOU TO NOD OFF OR FALL ASLEEP IN A CAR, WHILE STOPPED FOR A FEW MINUTES IN TRAFFIC: WOULD NEVER DOZE
HOW LIKELY ARE YOU TO NOD OFF OR FALL ASLEEP WHILE SITTING AND TALKING TO SOMEONE: WOULD NEVER DOZE
HOW LIKELY ARE YOU TO NOD OFF OR FALL ASLEEP WHEN YOU ARE A PASSENGER IN A CAR FOR AN HOUR WITHOUT A BREAK: WOULD NEVER DOZE
HOW LIKELY ARE YOU TO NOD OFF OR FALL ASLEEP WHILE WATCHING TV: SLIGHT CHANCE OF DOZING
HOW LIKELY ARE YOU TO NOD OFF OR FALL ASLEEP WHILE LYING DOWN TO REST IN THE AFTERNOON WHEN CIRCUMSTANCES PERMIT: WOULD NEVER DOZE
HOW LIKELY ARE YOU TO NOD OFF OR FALL ASLEEP WHILE SITTING AND READING: SLIGHT CHANCE OF DOZING

## 2024-01-10 NOTE — PATIENT INSTRUCTIONS

## 2024-01-10 NOTE — PROGRESS NOTES
[facility]   [facility]  Outpatient Sleep Medicine Consultation  January 10, 2024    Name: Yunior Kirk MRN# [id number]   Age: 58 year old YOB: 1965     Date of Consultation: January 10, 2024  Consultation is requested by: No referring provider defined for this encounter.  Primary care provider: Royer Treadwell  Yunior Kirk is a 58 year old male              Assessment and Plan:     58-year-old male with history of moderate to severe obstructive sleep apnea on continuous positive airway pressure at 8 cm of water with excellent compliance.  Yunior does report frequent snoring as complained by his wife despite being on continuous positive airway pressure at 8 cm of water.  His residual AHI is 1.2 events per hour which is therapeutic nevertheless he may require higher positive airway pressure to resolve upper airway resistance and snoring.  Sleep disordered breathing. Obstructive sleep apnea of moderate severity (AHI 18.7/hour REM AHI 54.5/hour).  Currently on CPAP 8 cm of water with excellent compliance and residual AHI of 1.2/h showing effective therapy.  Residual snoring at CPAP 8 cm of water.  Patient's wife is reporting frequent residual snoring despite his use of CPAP at current pressures.  He denies any recent change in his sleep and denies much symptoms of excessive daytime sleepiness.   Yunior's current device is over 11 years old and its display has stopped working.  He is also requesting for an additional device for traveling    Comorbid Diagnoses:    Class III obesity.    Summary Recommendations:    Changes current settings of positive airway pressure to auto CPAP 8-12 cm of water.  A request for a new device with the change settings has been made and an additional traveling device has been requested.  Importance of optimum sleep duration and sleep hygiene was discussed in detail and advised.  He is advised not to drive or operate heavy  machinery when drowsy.    Orders Placed This Encounter   Procedures    Comprehensive DME     Check out [email or web address]           History of Present Illness:     Yunior Kirk is a 58 year old male with history of hypertension and known diagnosis of moderately severe obstructive sleep apnea.  He underwent an in-lab type I sleep study on December 9, 2013.  The study showed a sleep efficiency of 76.9% with slightly increased sleep latency of 36.5 minutes and an apnea hypopnea index of 18.7 events per hour.  During the REM sleep the AHI was significantly elevated at 54.5 events per hour.  CPAP titration was conducted and it is 8 cm of water positive airway pressure his residual AHI was normalized to 1.4 events per hour.  He has been using his device regularly and his recent therapy data shows excellent compliance and effective therapy.  His device is old and over the last year the display has started malfunctioning.  He is here to discuss replacement of his older device and also expressed his desire to have an additional device for traveling.  No new complaints were made today.  He does mention that frequently his wife has brought to his attention of his residual snoring despite using the CPAP.    PREVIOUS SLEEP STUDIES:  Date: 12/09/2013  AHI: 18.7 per hour  Intervention: CPAP 8 cm H2O    CURRENT THERAPY-Subjective:  Sleep wake schedule:   Workday bedtime 10 PM awakening 5:30 AM to 6 AM using alarm.  Nonworkday bedtime 10 to 11 PM awakening 7 to 8 AM  Awakenings for 0-5 minutes 1-2 times/week  Naps denies     Overall, he rates the experience with PAP as 9 (0 poor, 10 great). The mask is comfortable. The mask is not leaking . He is snoring with the mask on. He is not having gasp arousals.  He is not having significant oral/nasal dryness. The pressure is comfortable.     His PAP interface is Nasal Pillows.      Total score - [place]: 2 (1/10/2024  3:34 PM)      REX Total Score: 8      Objective:  CPAP  "Compliance Targets:   >70% days > 4 hours AHI < 5   30 days ending January 10, 2024     [facility]   CPAP 8 cmH2O 30 day usage data:  93% of days with > 4 hours of use.  28/30 days with no use.   Average use 7 hours 27 minutes per day.   95%ile Leak 17.1 l/min.   AHI 1.2 events per hour.               Medications:     Current Outpatient Medications   Medication Sig    lisinopril (ZESTRIL) 10 MG tablet Take 1 tablet (10 mg) by mouth daily     No current facility-administered medications for this visit.        No Known Allergies         Past Medical History:     Does not need 02 supplement at night   History reviewed. No pertinent past medical history.          Past Surgical History:    No h/o  upper airway surgery  Past Surgical History:   Procedure Laterality Date    APPENDECTOMY  01/1985    COLONOSCOPY  06/12/2020    Tubular adenoma x2; repeat in 5yrs    COLONOSCOPY  05/01/2014    Tubular adenoma x2; tubuvillous adenoma x1; repeat in 3yrs    POLYSOMNOGRAPHY  12/09/2013    AHI:  18.7/hr with REM AHI of 54.5/hr.  CPAP titrated to +8 cm H20 which reduced AHI to 1.4/hr and eliminated snoring                      Physical Examination:     Objective   Vital signs:     BP: 134/83 Pulse: 73   Resp: 18 SpO2: 96 %     Height: 180.3 cm (5' 11\") Weight: 117.5 kg (259 lb)    Estimated body mass index is 36.12 kg/m  as calculated from the following:    Height as of this encounter: 1.803 m (5' 11\").    Weight as of this encounter: 117.5 kg (259 lb).       Psych: Alert and oriented times 3; coherent speech, normal   rate and volume, able to articulate logical thoughts, able   to abstract reason, no tangential thoughts, no hallucinations   or delusions  His affect is normal with no evidence of distress.  HEENT: Pupils are equally reactive bilaterally, extraocular muscles are intact, Mallampati: 3  Neck: Supple, 18 inches, no adenopathy noted.  Respiratory: Comfortable breathing, synchronous with no evidence of accessory muscle use no " audible wheeze.  Cardiovascular: Regular rate and rhythm S1 and S2 audible no added sounds.  Nervous system: Normal gait, intact cranial nerves, normal motor function grossly.  Musculoskeletal: No obvious inflammation or erythema noted on exposed joints.  Skin/integument: Dry no evidence of hyperpigmentation or cyanosis over exposed areas.    Copy to: Royer Treadwell MD 1/10/2024     [facility]   [address], [address]   [address] [place] [address]. [place] [address]   Appointments: [phone]    Total of 40 of time was spent with patient, this included the interview and exam, and review of the chart/labs/imaging/sleep study/PAP therapy data on 01/10/2024 . Greater than 50% of which was spent counseling and coordinating care.

## 2024-01-11 DIAGNOSIS — G47.33 OBSTRUCTIVE SLEEP APNEA (ADULT) (PEDIATRIC): Primary | ICD-10-CM

## 2024-03-27 DIAGNOSIS — I10 PRIMARY HYPERTENSION: ICD-10-CM

## 2024-03-28 RX ORDER — LISINOPRIL 10 MG/1
TABLET ORAL
Qty: 90 TABLET | Refills: 3 | Status: SHIPPED | OUTPATIENT
Start: 2024-03-28

## 2025-01-02 ENCOUNTER — TELEPHONE (OUTPATIENT)
Dept: FAMILY MEDICINE | Facility: CLINIC | Age: 60
End: 2025-01-02
Payer: COMMERCIAL

## 2025-01-02 NOTE — TELEPHONE ENCOUNTER
Patient Returning Call    Reason for call:  Patient is requesting a call back unsure if pcp can give him a cortisone shot for his Plantar Fasciitis. Please check and advise.     Information relayed to patient:  24/48 hrs for a call back    Patient has additional questions:  No      Okay to leave a detailed message?: Yes at Cell number on file:    Telephone Information:   Mobile [phone]

## 2025-01-02 NOTE — TELEPHONE ENCOUNTER
Pt informed that Dr. Treadwell doesn't do cortisone injections for plantar fasciitis.   Does have appt set up for 01/03/2025 with podiatrist.

## 2025-02-11 NOTE — NURSING NOTE
Hearing and Vision Screening Entered On:  2/25/2020 1:56 PM CST    Performed On:  2/25/2020 1:56 PM CST by Naomie Coles CMA               Hearing and Vision Screening   Audiogram Result Right Ear :   Fail   Audiogram Result Left Ear :   Fail   Naomie Coles CMA - 2/25/2020 1:56 PM CST   Pt. No need therapeutic phlebotomy treatment today. Pt's HCT today is 49.2.  Pt. Instructed to call if feeling symptomatic.    Next appointment will schedule for 6 months with CBC, CMP, BHCG, AFP and LDH.

## 2025-03-27 ENCOUNTER — OFFICE VISIT (OUTPATIENT)
Dept: FAMILY MEDICINE | Facility: CLINIC | Age: 60
End: 2025-03-27
Payer: COMMERCIAL

## 2025-03-27 ENCOUNTER — ANCILLARY PROCEDURE (OUTPATIENT)
Dept: GENERAL RADIOLOGY | Facility: CLINIC | Age: 60
End: 2025-03-27
Attending: FAMILY MEDICINE
Payer: COMMERCIAL

## 2025-03-27 VITALS
TEMPERATURE: 98.2 F | RESPIRATION RATE: 20 BRPM | WEIGHT: 248 LBS | HEIGHT: 71 IN | SYSTOLIC BLOOD PRESSURE: 125 MMHG | DIASTOLIC BLOOD PRESSURE: 81 MMHG | OXYGEN SATURATION: 98 % | BODY MASS INDEX: 34.72 KG/M2 | HEART RATE: 60 BPM

## 2025-03-27 DIAGNOSIS — M25.551 HIP PAIN, RIGHT: ICD-10-CM

## 2025-03-27 DIAGNOSIS — L40.9 PSORIASIS: ICD-10-CM

## 2025-03-27 DIAGNOSIS — I10 PRIMARY HYPERTENSION: Primary | ICD-10-CM

## 2025-03-27 RX ORDER — LISINOPRIL 10 MG/1
10 TABLET ORAL DAILY
Qty: 90 TABLET | Refills: 3 | Status: SHIPPED | OUTPATIENT
Start: 2025-03-27

## 2025-03-27 RX ORDER — BETAMETHASONE DIPROPIONATE 0.5 MG/G
CREAM TOPICAL 2 TIMES DAILY
Qty: 45 G | Refills: 1 | Status: SHIPPED | OUTPATIENT
Start: 2025-03-27

## 2025-03-27 NOTE — PROGRESS NOTES
"  Assessment & Plan     Primary hypertension  Controlled, continue current medication  He will obtain laboratory evaluation done through his work health and wellness program.  He has had a BMP done within the last 6 months  - lisinopril (ZESTRIL) 10 MG tablet; Take 1 tablet (10 mg) by mouth daily.    Hip pain, right  X-rays show mild degenerative changes the right hip.  I suspect he has hip joint mediated pain given his exam.  Advised anti-inflammatories and follow-up if not improving.  Consider orthopedic referral if that is the case.  - XR Pelvis and Hip Right 1 View; Future    Psoriasis  He has large psoriatic lesion on the left shin.  Betamethasone dipropionate has been prescribed.  Follow-up if not improving.  - betamethasone dipropionate (DIPROSONE) 0.05 % external cream; Apply topically 2 times daily.          BMI  Estimated body mass index is 34.59 kg/m  as calculated from the following:    Height as of this encounter: 1.803 m (5' 11\").    Weight as of this encounter: 112.5 kg (248 lb).             Adela Mojica is a 59 year old, presenting for the following health issues:Pt also c/o Right hip pain x 2-3 weeks  Hypertension (Pt here for a HTN recheck) and Derm Problem (Pt also c/o itchy rash on lower left leg for \"a long time\")      3/27/2025     4:34 PM   Additional Questions   Roomed by Matthew CARRINGTON     History of Present Illness       Hypertension: He presents for follow up of hypertension.  He does check blood pressure  regularly outside of the clinic. Outpatient blood pressures have not been over 140/90. He does not follow a low salt diet.     He eats 2-3 servings of fruits and vegetables daily.He consumes 0 sweetened beverage(s) daily.He exercises with enough effort to increase his heart rate 30 to 60 minutes per day.  He exercises with enough effort to increase his heart rate 7 days per week.   He is taking medications regularly.      Patient is here for follow-up on his hypertension.  He takes " "lisinopril 10 mg daily.  This has been controlling his blood pressure.  He denies any side effects of medication.  He reports right hip pain.  He has difficulty walking on uneven surfaces.  He localizes the pain to his groin.  He has difficulty at times with extreme flexion and internal/external rotation.  He continues to have trouble with a psoriatic lesion on the left anterior leg.            Review of Systems  Constitutional, neuro, ENT, endocrine, pulmonary, cardiac, gastrointestinal, genitourinary, musculoskeletal, integument and psychiatric systems are negative, except as otherwise noted.      Objective    /81 (BP Location: Right arm, Patient Position: Sitting, Cuff Size: Adult Large)   Pulse 60   Temp 98.2  F (36.8  C) (Tympanic)   Resp 20   Ht 1.803 m (5' 11\")   Wt 112.5 kg (248 lb)   SpO2 98%   BMI 34.59 kg/m    Body mass index is 34.59 kg/m .  Physical Exam   Alert, oriented, no acute distress  Lungs are clear  Heart has a regular rate and rhythm  Exam of the hips reveals slightly decreased range of motion on the right.  He has tenderness in the groin.  Skin exam reveals psoriatic plaque left anterior leg            Signed Electronically by: Royer Treadwell MD    "

## 2025-07-21 ENCOUNTER — TELEPHONE (OUTPATIENT)
Dept: PODIATRY | Facility: CLINIC | Age: 60
End: 2025-07-21
Payer: COMMERCIAL

## 2025-07-21 NOTE — TELEPHONE ENCOUNTER
Patient had left plantar fascial injection on 1/3/25 by Dr. Pastrana.     Phone call to patient. He states he has had good relief of pain up until about a week ago. He had played [employer] ball and was jumping and very active. During the game his left foot starting hurting, but he denies a specific injury. It has been painful ever since.  He has been icy, wearing a compression sock, and taping. Pain is located in the arch of his foot. He has inserts that he wears in his shoes, but currently the only comfortable shoe without pain is a Birkenstock.   He is currently on vacation but planning to leave for a trip to [place] on 7/25 - 8/1/25 that involves a lot of hiking.     Discussed that he may have sustained an injury. Will discuss with provider and get back with him.     See note below, if he doesn't answer on cell, call the home phone and can leave a message.     Please advise if this patient can be worked in this week before his trip for an injection or what is recommended.     PRINCESS Branch RN

## 2025-07-21 NOTE — TELEPHONE ENCOUNTER
Other: Patient would like to get a cortisone injection this week if possible for his L foot. Saw Dr. Pastrana back on 01/03/25 and got a cortisone injection that day as well. Patient has a trip for work this Friday and would like to get an injection before then if possible he is having a lot of L foot pain. If Dr. Pastrana is not available patient would to know if someone else can do the injection for him.        Could we [facility] this information to you [facility] or would you prefer to receive a phone call?:   Patient would prefer a phone call   Okay to leave a detailed message?: No at Cell number on file:    Telephone Information:   Mobile [phone]     -If patient does not answer his mobile # please contact his home #: [phone] and can leave a detailed message there.

## 2025-07-21 NOTE — TELEPHONE ENCOUNTER
You can use my acute slot on Thursday morning the 7/24/2025.  I recommend that he get an ankle brace from [employer] or [employer] and wear that in the meantime.    Recommend shock doc:        Please let patient know.     THanks,     Kayla Pastrana DPM

## 2025-07-21 NOTE — TELEPHONE ENCOUNTER
Phone call to patient and he was informed of recommendations below. Appointment scheduled for 7/24/25 at 10 am with 9:45 am check in at the [place] location with Dr. Pastrana.   He states he thinks he already has the brace she is recommending and will continue to wear that.     He has had difficulty signing up for [employer]. Provided the [employer] support line phone number for him. He verbalized understanding.     PRINCESS Branch RN

## 2025-07-24 ENCOUNTER — OFFICE VISIT (OUTPATIENT)
Dept: PODIATRY | Facility: CLINIC | Age: 60
End: 2025-07-24
Payer: COMMERCIAL

## 2025-07-24 VITALS — WEIGHT: 248 LBS | BODY MASS INDEX: 34.59 KG/M2

## 2025-07-24 DIAGNOSIS — M79.672 LEFT FOOT PAIN: Primary | ICD-10-CM

## 2025-07-24 DIAGNOSIS — M72.2 PLANTAR FASCIITIS, LEFT: ICD-10-CM

## 2025-07-24 RX ORDER — BUPIVACAINE HYDROCHLORIDE 5 MG/ML
2 INJECTION, SOLUTION EPIDURAL; INTRACAUDAL; PERINEURAL
Status: COMPLETED | OUTPATIENT
Start: 2025-07-24 | End: 2025-07-24

## 2025-07-24 RX ORDER — TRIAMCINOLONE ACETONIDE 40 MG/ML
40 INJECTION, SUSPENSION INTRA-ARTICULAR; INTRAMUSCULAR
Status: COMPLETED | OUTPATIENT
Start: 2025-07-24 | End: 2025-07-24

## 2025-07-24 RX ADMIN — TRIAMCINOLONE ACETONIDE 40 MG: 40 INJECTION, SUSPENSION INTRA-ARTICULAR; INTRAMUSCULAR at 10:16

## 2025-07-24 RX ADMIN — BUPIVACAINE HYDROCHLORIDE 2 ML: 5 INJECTION, SOLUTION EPIDURAL; INTRACAUDAL; PERINEURAL at 10:16

## 2025-07-24 NOTE — PROGRESS NOTES
Podiatry / Foot and Ankle Surgery Progress Note    July 24, 2025    Subject: Patient was seen for heel pain.  Notes that the injections have been working but they seem to not be lasting as long.  He is hoping to get another 1 today as he is going on a fishing trip in [place].  He is wondering if there is any other options that can be done for this.    Objective:  Vitals: Wt 112.5 kg (248 lb)   BMI 34.59 kg/m    BMI= Body mass index is 34.59 kg/m .    General:  Patient is alert and orientated.  NAD.    Dermatologic: Skin is intact to both lower extremities without significant lesions, rash or abrasion.  No paronychia or evidence of soft tissue infection is noted.     Vascular: DP & PT pulses are intact & regular bilaterally.  No significant edema or varicosities noted.  CFT and skin temperature is normal to both lower extremities.     Neurologic: Lower extremity sensation is intact to light touch.  No evidence of weakness or contracture in the lower extremities.  No evidence of neuropathy.     Musculoskeletal: Patient is ambulatory without assistive device or brace.  Increased arch height bilaterally.  Pain on palpation to the plantar medial aspect of the left heel.     ASSESSMENT:    Pes cavus of both feet  Plantar fasciitis, left     Medical Decision Making/Plan:  [facility] in [facility].  The potential causes and nature of plantar fasciitis were discussed with the patient.  We reviewed the natural history/prognosis of the condition and risks if left untreated.  These include chronic pain, other sites of pain due to gait changes, and potential plantar fascial rupture.      We discussed possible causes of the condition as it relates to the patients specific situation.      Conservative treatment options were reviewed:  appropriate shoes, avoidance of barefoot walking, inserts/orthoses, stretching, ice, massage, immobilization and NSAIDs.     We also reviewed the options of injection therapy and surgery.   However, it was made clear that surgery is only considered when conservative therapy fails.  The risks and benefits of injection therapy, and surgery were discussed.     After thorough discussion and answering all questions, the patient elected to try injections today.  Please see procedure note below.      We did talk about surgery where we would go in and release the plantar fascia and lengthen the Achilles tendon.  He is not interested in surgery.  We will also order some blood work to see if there might be some underlying systemic arthritis that could be contributing to recurrent pain and inflammation such as gout.  He will get these labs drawn at a primary care clinic and when we get the results we will call him or MyChart him.        All questions were answered to patient satisfaction and he will call further questions or concerns.    Procedure: Medium Joint Injection/Arthrocentesis: L ankle    Date/Time: 7/24/2025 10:16 AM    Performed by: Kayla Pastrana DPM, Podiatry/Foot and Ankle Surgery  Authorized by: Kayla Pastrana DPM, Podiatry/Foot and Ankle Surgery    Indications:  Pain  Needle Size:  25 G  Guidance: surface landmarks    Approach:  Medial  Location:  Ankle  Site:  L ankle  Medications:  40 mg triamcinolone 40 MG/ML; 2 mL BUPivacaine (PF) 0.5 %  Outcome:  Tolerated well, no immediate complications  Procedure discussed: discussed risks, benefits, and alternatives    Consent Given by:  Patient  Timeout: timeout called immediately prior to procedure    Prep: patient was prepped and draped in usual sterile fashion        Kayla Pastrana DPM, Podiatry/Foot and Ankle Surgery

## 2025-07-24 NOTE — LETTER
7/24/2025      Yuniorbradford Kirk  [address] [place] [address]      Dear Colleague,    Thank you for referring your patient, Yuniorbradford Kirk, to the [facility]. Please see a copy of my visit note below.    Podiatry / Foot and Ankle Surgery Progress Note    July 24, 2025    Subject: Patient was seen for heel pain.  Notes that the injections have been working but they seem to not be lasting as long.  He is hoping to get another 1 today as he is going on a fishing trip in [place].  He is wondering if there is any other options that can be done for this.    Objective:  Vitals: Wt 112.5 kg (248 lb)   BMI 34.59 kg/m    BMI= Body mass index is 34.59 kg/m .    General:  Patient is alert and orientated.  NAD.    Dermatologic: Skin is intact to both lower extremities without significant lesions, rash or abrasion.  No paronychia or evidence of soft tissue infection is noted.     Vascular: DP & PT pulses are intact & regular bilaterally.  No significant edema or varicosities noted.  CFT and skin temperature is normal to both lower extremities.     Neurologic: Lower extremity sensation is intact to light touch.  No evidence of weakness or contracture in the lower extremities.  No evidence of neuropathy.     Musculoskeletal: Patient is ambulatory without assistive device or brace.  Increased arch height bilaterally.  Pain on palpation to the plantar medial aspect of the left heel.     ASSESSMENT:    Pes cavus of both feet  Plantar fasciitis, left     Medical Decision Making/Plan:  Reviewed patient's chart in [facility].  The potential causes and nature of plantar fasciitis were discussed with the patient.  We reviewed the natural history/prognosis of the condition and risks if left untreated.  These include chronic pain, other sites of pain due to gait changes, and potential plantar fascial rupture.      We discussed possible causes of the condition as it relates to the patients specific situation.       Conservative treatment options were reviewed:  appropriate shoes, avoidance of barefoot walking, inserts/orthoses, stretching, ice, massage, immobilization and NSAIDs.     We also reviewed the options of injection therapy and surgery.  However, it was made clear that surgery is only considered when conservative therapy fails.  The risks and benefits of injection therapy, and surgery were discussed.     After thorough discussion and answering all questions, the patient elected to try injections today.  Please see procedure note below.      We did talk about surgery where we would go in and release the plantar fascia and lengthen the Achilles tendon.  He is not interested in surgery.  We will also order some blood work to see if there might be some underlying systemic arthritis that could be contributing to recurrent pain and inflammation such as gout.  He will get these labs drawn at a primary care clinic and when we get the results we will call him or MyChart him.        All questions were answered to patient satisfaction and he will call further questions or concerns.    Procedure: Medium Joint Injection/Arthrocentesis: L ankle    Date/Time: 7/24/2025 10:16 AM    Performed by: Kayla Pastrana DPM, Podiatry/Foot and Ankle Surgery  Authorized by: Kayla Pastrana DPM, Podiatry/Foot and Ankle Surgery    Indications:  Pain  Needle Size:  25 G  Guidance: surface landmarks    Approach:  Medial  Location:  Ankle  Site:  L ankle  Medications:  40 mg triamcinolone 40 MG/ML; 2 mL BUPivacaine (PF) 0.5 %  Outcome:  Tolerated well, no immediate complications  Procedure discussed: discussed risks, benefits, and alternatives    Consent Given by:  Patient  Timeout: timeout called immediately prior to procedure    Prep: patient was prepped and draped in usual sterile fashion        Kayla Pastrana DPM, Podiatry/Foot and Ankle Surgery      Again, thank you for allowing me to participate in the care of your patient.         Sincerely,        Kayla Pastrana DPM, Podiatry/Foot and Ankle Surgery    Electronically signed